# Patient Record
Sex: MALE | Race: WHITE
[De-identification: names, ages, dates, MRNs, and addresses within clinical notes are randomized per-mention and may not be internally consistent; named-entity substitution may affect disease eponyms.]

---

## 2017-09-21 ENCOUNTER — HOSPITAL ENCOUNTER (OUTPATIENT)
Dept: HOSPITAL 17 - HRAD | Age: 82
Discharge: HOME | End: 2017-09-21
Attending: INTERNAL MEDICINE
Payer: MEDICARE

## 2017-09-21 VITALS
OXYGEN SATURATION: 97 % | RESPIRATION RATE: 14 BRPM | SYSTOLIC BLOOD PRESSURE: 178 MMHG | TEMPERATURE: 97.2 F | DIASTOLIC BLOOD PRESSURE: 78 MMHG | HEART RATE: 51 BPM

## 2017-09-21 VITALS
HEART RATE: 67 BPM | TEMPERATURE: 97.5 F | OXYGEN SATURATION: 99 % | SYSTOLIC BLOOD PRESSURE: 180 MMHG | DIASTOLIC BLOOD PRESSURE: 84 MMHG | RESPIRATION RATE: 20 BRPM

## 2017-09-21 VITALS
SYSTOLIC BLOOD PRESSURE: 187 MMHG | OXYGEN SATURATION: 98 % | HEART RATE: 68 BPM | DIASTOLIC BLOOD PRESSURE: 85 MMHG | RESPIRATION RATE: 18 BRPM

## 2017-09-21 DIAGNOSIS — I10: ICD-10-CM

## 2017-09-21 DIAGNOSIS — E03.9: ICD-10-CM

## 2017-09-21 DIAGNOSIS — E78.00: ICD-10-CM

## 2017-09-21 DIAGNOSIS — R59.0: Primary | ICD-10-CM

## 2017-09-21 PROCEDURE — 76942 ECHO GUIDE FOR BIOPSY: CPT

## 2017-09-21 PROCEDURE — 88184 FLOWCYTOMETRY/ TC 1 MARKER: CPT

## 2017-09-21 PROCEDURE — 88185 FLOWCYTOMETRY/TC ADD-ON: CPT

## 2017-09-21 PROCEDURE — 38505 NEEDLE BIOPSY LYMPH NODES: CPT

## 2017-09-21 PROCEDURE — 88305 TISSUE EXAM BY PATHOLOGIST: CPT

## 2017-09-21 NOTE — RADRPT
EXAM DATE/TIME:  09/21/2017 12:25 

 

[facility] COMPARISON:     

No previous studies available for comparison.

 

 

INDICATIONS :      

Right neck enlarged lymph node.

 

 MEDICAL HISTORY :     

Hypertension. Hypothyroidism. Hypercholesterolemia. 

 

SURGICAL HISTORY :        

Cardiac stents.  Back surgery.

 

ENCOUNTER:     

Subsequent

 

ACUITY:     

2 weeks

 

PAIN SCORE:     

10/10

 

LOCATION:     

Right neck.

                     

 

ORGAN:      

Right lymph node 

 

SPECIMENS:      

Three core specimen(s) submitted for pathologic evaluation.

 

DEVICE:      

18 gauge Temno needle

                     

Post procedure scanning reveals no hematoma or other complication.

 

The possibility does exist that the tissue obtained will be non-diagnostic.  If the sample is non-gerard
gnostic a repeat

biopsy or surgical biopsy may need to be performed.  

 

TECHNIQUE:  

1.  Ultrasound guidance for needle biopsy.

2.  Needle biopsy.

 

The risks, benefits and alternatives to the procedure were explained and verbal and written consent w
as obtained.  The site was prepped in sterile fashion.  Full sterile technique was used, including ca
p, mask, sterile gloves and gown and a large sterile sheet.  Hand hygiene and 2% chlorhexidine and/or
 betadine/alcohol prep was utilized per protocol for cutaneous antisepsis.  The skin and subcutaneous
 tissues were infiltrated with local anesthetic solution.  Sterile gel and sterile probe cover were u
tilized for ultrasound guidance.

 

With the patient on the ultrasound table, images were obtained.  

 

A needle was advanced into the identified target and the number of specimens as above obtained and grewal
bmitted for pathologic evaluation.

 

The patient tolerated the procedure well and left the ultrasound suite in stable condition. 

 

CONCLUSION:     

Uncomplicated ultrasound guided needle biopsy of enlarged lymph node right posterior neck.  

 

 

 

 Alfredo Shipley MD on September 21, 2017 at 15:13           

Board Certified Radiologist.

 This report was verified electronically.

## 2018-03-27 ENCOUNTER — HOSPITAL ENCOUNTER (OUTPATIENT)
Dept: HOSPITAL 17 - NEPE | Age: 83
Setting detail: OBSERVATION
LOS: 2 days | Discharge: HOME HEALTH | End: 2018-03-29
Attending: HOSPITALIST | Admitting: HOSPITALIST
Payer: MEDICARE

## 2018-03-27 VITALS
HEART RATE: 57 BPM | SYSTOLIC BLOOD PRESSURE: 179 MMHG | RESPIRATION RATE: 20 BRPM | TEMPERATURE: 97.2 F | DIASTOLIC BLOOD PRESSURE: 80 MMHG | OXYGEN SATURATION: 96 %

## 2018-03-27 VITALS
RESPIRATION RATE: 17 BRPM | TEMPERATURE: 97.6 F | SYSTOLIC BLOOD PRESSURE: 163 MMHG | OXYGEN SATURATION: 100 % | DIASTOLIC BLOOD PRESSURE: 76 MMHG | HEART RATE: 61 BPM

## 2018-03-27 VITALS
OXYGEN SATURATION: 100 % | RESPIRATION RATE: 17 BRPM | TEMPERATURE: 97.6 F | HEART RATE: 61 BPM | SYSTOLIC BLOOD PRESSURE: 163 MMHG | DIASTOLIC BLOOD PRESSURE: 76 MMHG

## 2018-03-27 VITALS
TEMPERATURE: 97.6 F | RESPIRATION RATE: 18 BRPM | SYSTOLIC BLOOD PRESSURE: 145 MMHG | OXYGEN SATURATION: 95 % | HEART RATE: 60 BPM | DIASTOLIC BLOOD PRESSURE: 67 MMHG

## 2018-03-27 VITALS
SYSTOLIC BLOOD PRESSURE: 163 MMHG | RESPIRATION RATE: 17 BRPM | OXYGEN SATURATION: 100 % | TEMPERATURE: 97.6 F | HEART RATE: 61 BPM | DIASTOLIC BLOOD PRESSURE: 76 MMHG

## 2018-03-27 VITALS
OXYGEN SATURATION: 97 % | SYSTOLIC BLOOD PRESSURE: 141 MMHG | DIASTOLIC BLOOD PRESSURE: 80 MMHG | RESPIRATION RATE: 18 BRPM | HEART RATE: 54 BPM | TEMPERATURE: 98 F

## 2018-03-27 VITALS
OXYGEN SATURATION: 100 % | HEART RATE: 61 BPM | SYSTOLIC BLOOD PRESSURE: 163 MMHG | TEMPERATURE: 97.6 F | DIASTOLIC BLOOD PRESSURE: 79 MMHG | RESPIRATION RATE: 17 BRPM

## 2018-03-27 VITALS — HEIGHT: 71 IN | WEIGHT: 148.37 LBS | BODY MASS INDEX: 20.77 KG/M2

## 2018-03-27 DIAGNOSIS — Z85.118: ICD-10-CM

## 2018-03-27 DIAGNOSIS — Z79.01: ICD-10-CM

## 2018-03-27 DIAGNOSIS — Z79.82: ICD-10-CM

## 2018-03-27 DIAGNOSIS — Z82.49: ICD-10-CM

## 2018-03-27 DIAGNOSIS — M19.90: ICD-10-CM

## 2018-03-27 DIAGNOSIS — E78.00: ICD-10-CM

## 2018-03-27 DIAGNOSIS — R51: ICD-10-CM

## 2018-03-27 DIAGNOSIS — I25.10: ICD-10-CM

## 2018-03-27 DIAGNOSIS — Z79.899: ICD-10-CM

## 2018-03-27 DIAGNOSIS — I45.2: ICD-10-CM

## 2018-03-27 DIAGNOSIS — Z95.5: ICD-10-CM

## 2018-03-27 DIAGNOSIS — R07.89: Primary | ICD-10-CM

## 2018-03-27 DIAGNOSIS — I10: ICD-10-CM

## 2018-03-27 DIAGNOSIS — Z85.89: ICD-10-CM

## 2018-03-27 DIAGNOSIS — E03.9: ICD-10-CM

## 2018-03-27 LAB
BASOPHILS # BLD AUTO: 0 TH/MM3 (ref 0–0.2)
BASOPHILS NFR BLD: 0.5 % (ref 0–2)
BUN SERPL-MCNC: 21 MG/DL (ref 7–18)
CALCIUM SERPL-MCNC: 9.2 MG/DL (ref 8.5–10.1)
CHLORIDE SERPL-SCNC: 108 MEQ/L (ref 98–107)
CHOLEST SERPL-MCNC: 177 MG/DL (ref 120–200)
CHOLESTEROL/ HDL RATIO: 4.82 RATIO
CREAT SERPL-MCNC: 1 MG/DL (ref 0.6–1.3)
EOSINOPHIL # BLD: 0.1 TH/MM3 (ref 0–0.4)
EOSINOPHIL NFR BLD: 1.6 % (ref 0–4)
ERYTHROCYTE [DISTWIDTH] IN BLOOD BY AUTOMATED COUNT: 14.7 % (ref 11.6–17.2)
GFR SERPLBLD BASED ON 1.73 SQ M-ARVRAT: 70 ML/MIN (ref 89–?)
GLUCOSE SERPL-MCNC: 104 MG/DL (ref 74–106)
HCO3 BLD-SCNC: 22 MEQ/L (ref 21–32)
HCT VFR BLD CALC: 38.2 % (ref 39–51)
HDLC SERPL-MCNC: 36.7 MG/DL (ref 40–60)
HGB BLD-MCNC: 13.2 GM/DL (ref 13–17)
INR PPP: 1.2 RATIO
LDLC SERPL-MCNC: 118 MG/DL (ref 0–99)
LYMPHOCYTES # BLD AUTO: 0.5 TH/MM3 (ref 1–4.8)
LYMPHOCYTES NFR BLD AUTO: 7.4 % (ref 9–44)
MAGNESIUM SERPL-MCNC: 2.4 MG/DL (ref 1.5–2.5)
MCH RBC QN AUTO: 29.2 PG (ref 27–34)
MCHC RBC AUTO-ENTMCNC: 34.5 % (ref 32–36)
MCV RBC AUTO: 84.8 FL (ref 80–100)
MONOCYTE #: 0.7 TH/MM3 (ref 0–0.9)
MONOCYTES NFR BLD: 10.2 % (ref 0–8)
NEUTROPHILS # BLD AUTO: 5.3 TH/MM3 (ref 1.8–7.7)
NEUTROPHILS NFR BLD AUTO: 80.3 % (ref 16–70)
PLATELET # BLD: 232 TH/MM3 (ref 150–450)
PMV BLD AUTO: 7.7 FL (ref 7–11)
PROTHROMBIN TIME: 11.7 SEC (ref 9.8–11.6)
RBC # BLD AUTO: 4.5 MIL/MM3 (ref 4.5–5.9)
SODIUM SERPL-SCNC: 141 MEQ/L (ref 136–145)
TRIGL SERPL-MCNC: 110 MG/DL (ref 42–150)
TROPONIN I SERPL-MCNC: (no result) NG/ML (ref 0.02–0.05)
WBC # BLD AUTO: 6.5 TH/MM3 (ref 4–11)

## 2018-03-27 PROCEDURE — 83735 ASSAY OF MAGNESIUM: CPT

## 2018-03-27 PROCEDURE — 80061 LIPID PANEL: CPT

## 2018-03-27 PROCEDURE — 71046 X-RAY EXAM CHEST 2 VIEWS: CPT

## 2018-03-27 PROCEDURE — G0378 HOSPITAL OBSERVATION PER HR: HCPCS

## 2018-03-27 PROCEDURE — 85610 PROTHROMBIN TIME: CPT

## 2018-03-27 PROCEDURE — 84484 ASSAY OF TROPONIN QUANT: CPT

## 2018-03-27 PROCEDURE — 80048 BASIC METABOLIC PNL TOTAL CA: CPT

## 2018-03-27 PROCEDURE — 96365 THER/PROPH/DIAG IV INF INIT: CPT

## 2018-03-27 PROCEDURE — 85730 THROMBOPLASTIN TIME PARTIAL: CPT

## 2018-03-27 PROCEDURE — 85025 COMPLETE CBC W/AUTO DIFF WBC: CPT

## 2018-03-27 PROCEDURE — 82550 ASSAY OF CK (CPK): CPT

## 2018-03-27 PROCEDURE — 96366 THER/PROPH/DIAG IV INF ADDON: CPT

## 2018-03-27 PROCEDURE — 93005 ELECTROCARDIOGRAM TRACING: CPT

## 2018-03-27 PROCEDURE — 96375 TX/PRO/DX INJ NEW DRUG ADDON: CPT

## 2018-03-27 NOTE — PD
HPI


Chief Complaint:  Chest Pain


Time Seen by Provider:  13:27


Travel History


International Travel<30 days:  No


Contact w/Intl Traveler<30days:  No


Traveled to known affect area:  No





History of Present Illness


HPI


89-year-old male came to the emergency room with history of sudden onset chest 

pain while he was in the waiting room to get his radiation therapy for his 

cancer.  Patient points to her substernal area and said the pain started 

radiating towards his left chest.  He got up and alerted one of the nurses who 

called 911.  Patient was in [facility] when this happened.  EMS gave him sublingual 

nitroglycerin and aspirin.  By the time patient arrived in the emergency room 

pain was 0.  He denies any associated symptoms.  Vital signs were stable.  

Patient has history of coronary artery disease and says that he had a stent put 

in about 3-4 years ago.  Patient says his cardiologist is Dr. Nunez.  Vital 

signs were stable.





PFSH


Past Medical History


*** Narrative Medical


List of his past medical, surgical, social and family history is reviewed from 

the nursing note.


Hx Anticoagulant Therapy:  Yes


Arthritis:  Yes


Blood Disorders:  No


Heart Rhythm Problems:  Yes (ATR. FIB)


Cancer:  Yes (LUNG)


Cardiovascular Problems:  Yes


Chemotherapy:  Yes


Endocrine:  No


Genitourinary:  No


Hypertension:  Yes


Immune Disorder:  No


Implanted Vascular Access Dvce:  Yes


Musculoskeletal:  Yes


Neurologic:  Yes


Psychiatric:  No


Reproductive:  No


Respiratory:  Yes


Radiation Therapy:  Yes


Tetanus Vaccination:  < 5 Years


Influenza Vaccination:  Yes





Past Surgical History


Eye Surgery:  Yes (CHESTER. CATARACT EXTRACT.)


Neurologic Surgery:  Yes (CERVICAL FUSION)


Other Surgery:  Yes





Social History


Alcohol Use:  Yes (WINE DAILY)


Tobacco Use:  No


Substance Use:  No





Allergies-Medications


(Allergen,Severity, Reaction):  


Coded Allergies:  


     No Known Allergies (Verified  Allergy, Unknown, 3/27/18)


Comments


No known drug allergies.


Reported Meds & Prescriptions





Reported Meds & Active Scripts


Active


Reported


Diltiazem ER 24  Mg Jose 240 Mg PO DAILY


Amiodarone (Amiodarone HCl) 200 Mg Tab 200 Mg PO DAILY


Levothyroxine (Levothyroxine Sodium) 75 Mcg Tab 75 Mcg PO DAILY


Warfarin 5 Mg Tab 5 Mg PO DAILY


Aspirin Unknown Strength Tab 1 Tab PO DAILY





Narrative Medication


List of his home medications reviewed from the nursing note.





Review of Systems


Except as stated in HPI:  all other systems reviewed are Neg


Cardiovascular:  Positive: Chest Pain or Discomfort





Physical Exam


Narrative


GENERAL: Awake, alert, elderly, frail, anxious


SKIN: Focused skin assessment warm/dry.


HEAD: Atraumatic. Normocephalic. 


EYES: Pupils equal and round. No scleral icterus. No injection or drainage. 


ENT: No nasal bleeding or discharge.  Mucous membranes pink and moist.


NECK: Trachea midline. No JVD. 


CARDIOVASCULAR: Regular rate and rhythm.  No murmur appreciated.


RESPIRATORY: No accessory muscle use. Clear to auscultation. Breath sounds 

equal bilaterally. 


GASTROINTESTINAL: Abdomen soft, non-tender, nondistended. Hepatic and splenic 

margins not palpable. 


MUSCULOSKELETAL: No obvious deformities. No clubbing.  No cyanosis.  No edema. 


NEUROLOGICAL: Awake and alert. No obvious cranial nerve deficits.  Motor 

grossly within normal limits. Normal speech.


PSYCHIATRIC: Appropriate mood and affect; insight and judgment normal.





Data


Data


Last Documented VS





Orders





 Orders


Electrocardiogram (3/27/18 13:29)


Basic Metabolic Panel (Bmp) (3/27/18 13:29)


Ckmb (Isoenzyme) Profile (3/27/18 13:29)


Complete Blood Count With Diff (3/27/18 13:29)


Magnesium (Mg) (3/27/18 13:29)


Prothrombin Time / Inr (Pt) (3/27/18 13:29)


Act Partial Throm Time (Ptt) (3/27/18 13:29)


Troponin I (3/27/18 13:29)


Ecg Monitoring (3/27/18 13:29)


Bilateral Bp Monitoring (3/27/18 13:29)


Iv Access Insert/Monitor (3/27/18 13:29)


Oximetry (3/27/18 13:29)


Oxygen Administration (3/27/18 13:29)


Sodium Chloride 0.9% Flush (Ns Flush) (3/27/18 13:30)


Chest, Pa & Lat (3/27/18 13:29)


Heparin Inj (Heparin Inj) (3/27/18 13:30)


Heparin Inj (Heparin Inj) (3/27/18 19:30)


Heparin Inj (Heparin Inj) (3/27/18 19:30)


Heparin-D5w 25,000 U/250 Ml (Heparin-D5w (3/27/18 13:45)


Act Partial Throm Time (Ptt) (3/27/18 20:30)


Admit Order (Ed Use Only) (3/27/18 14:50)





Labs





Laboratory Tests








Test


  3/27/18


13:45


 


White Blood Count 6.5 TH/MM3 


 


Red Blood Count 4.50 MIL/MM3 


 


Hemoglobin 13.2 GM/DL 


 


Hematocrit 38.2 % 


 


Mean Corpuscular Volume 84.8 FL 


 


Mean Corpuscular Hemoglobin 29.2 PG 


 


Mean Corpuscular Hemoglobin


Concent 34.5 % 


 


 


Red Cell Distribution Width 14.7 % 


 


Platelet Count 232 TH/MM3 


 


Mean Platelet Volume 7.7 FL 


 


Neutrophils (%) (Auto) 80.3 % 


 


Lymphocytes (%) (Auto) 7.4 % 


 


Monocytes (%) (Auto) 10.2 % 


 


Eosinophils (%) (Auto) 1.6 % 


 


Basophils (%) (Auto) 0.5 % 


 


Neutrophils # (Auto) 5.3 TH/MM3 


 


Lymphocytes # (Auto) 0.5 TH/MM3 


 


Monocytes # (Auto) 0.7 TH/MM3 


 


Eosinophils # (Auto) 0.1 TH/MM3 


 


Basophils # (Auto) 0.0 TH/MM3 


 


CBC Comment DIFF FINAL 


 


Differential Comment  


 


Prothrombin Time 11.7 SEC 


 


Prothromb Time International


Ratio 1.2 RATIO 


 


 


Activated Partial


Thromboplast Time 25.5 SEC 


 


 


Blood Urea Nitrogen 21 MG/DL 


 


Creatinine 1.00 MG/DL 


 


Random Glucose 104 MG/DL 


 


Calcium Level 9.2 MG/DL 


 


Magnesium Level 2.4 MG/DL 


 


Sodium Level 141 MEQ/L 


 


Potassium Level 3.8 MEQ/L 


 


Chloride Level 108 MEQ/L 


 


Carbon Dioxide Level 22.0 MEQ/L 


 


Anion Gap 11 MEQ/L 


 


Estimat Glomerular Filtration


Rate 70 ML/MIN 


 


 


Total Creatine Kinase 70 U/L 


 


Troponin I


  LESS THAN 0.02


NG/ML











MDM


Medical Decision Making


Medical Screen Exam Complete:  Yes


Emergency Medical Condition:  Yes


Medical Record Reviewed:  Yes


Interpretation(s)


Twelve-lead EKG was reviewed by me.  Normal sinus rhythm, normal axis, right 

bundle branch block, septal ST depressions that are new since 2009 EKG.  Heart 

rate of 62 bpm.


Differential Diagnosis


ACS, non-STEMI


Narrative Course


2:45 PM I discussed the case after looking at the EKG with patient's 

cardiologist Dr. Hernandez.  I started the patient on heparin bolus and drip and 

the cardiologist was in agreement with this plan.  He wanted the patient to be 

admitted to the hospitalist service and he will consult on the patient.  Blood 

test results of back and they're within acceptable limits.  I discussed the 

case with the hospitalist was accepted the patient.


Critical Care Narrative


Aggregate critical care time was 30 minutes. Time to perform other separately 

billable procedures was not  


included in the critical care time. My time did not include minutes spent 

treating any other patients simultaneously or on  


activities that did not directly contribute to the patient's treatment.  





The services I provided to this patient were to treat and/or prevent clinically 

significant deterioration that could result  


in: ACS, heparin bolus and drip





I provided critical care services requiring my management, as noted below:


Chart data review, documentation time, medication orders and management, vital 

sign assessments/reviewing monitor data,  


ordering and reviewing lab tests, ordering and interpreting/reviewing x-rays 

and diagnostic studies, care of the patient  


and discussion of the patient with the admitting physicians.





Procedures


EKG Prior to Arrival:  Yes





Physician Communication


Physician Communication


Dr. Hernandez





Diagnosis





 Primary Impression:  


 Chest pain


 Qualified Codes:  R07.9 - Chest pain, unspecified


 Additional Impression:  


 History of coronary artery disease





Admitting Information


Admitting Physician Requests:  Observation


Scripts


Isosorbide Mononitrate ER (Isosorbide Mononitrate ER) 30 Mg Jose


30 MG PO DAILY@07 for Chest Pain for 30 Days, #30 TAB


   Prov: Mark Hagan MD         3/29/18 


Atorvastatin (Atorvastatin) 80 Mg Tab


80 MG PO DAILY for ACS for 30 Days, #30 TAB


   Prov: Mark Hagan MD         3/29/18 


Clopidogrel (Plavix) 75 Mg Tab


75 MG PO DAILY for ACS for 30 Days, #30 TAB 1 Refill


   Prov: Mark Hagan MD         3/29/18











William Roberts MD Mar 27, 2018 14:49

## 2018-03-27 NOTE — MB
cc:

Sylvester Hernandez MD

****

 

 

DATE:

03/27/2018

 

REASON FOR CONSULTATION:

I was asked to evaluate the patient with chest pain syndrome and 

history of coronary artery disease.

HISTORY OF PRESENT ILLNESS:

This is a pleasant 89-year-old gentleman with a past medical history 

significant for paroxysmal atrial fibrillation, status post 

cardioversion x 3, high risk CHADS-VASc score, hypertension, 

hypercholesterolemia, mild cardiomyopathy, ejection fraction 40% and 

coronary artery disease, status post stent to the LAD 11/2011.  

Unfortunately, he has recurrent left upper lung cancer.  This morning,

[facility] waiting to have his radiation treatment.  

He developed rapidly progressive shortness of breath and chest pain 

across his left precordium.  The pain was an achy discomfort, 

prompting him to notify the attendant nurse.  He was given a 

nitroglycerin and aspirin by EMS.  His chest pain resolved upon 

arrival to the emergency room.

 

ER evaluation significant for EKG sinus rhythm, right bundle branch 

block, diffuse nonspecific ST-T abnormalities.  First troponin level 

is negative for acute coronary syndrome.

 

MEDICATIONS PRIOR TO ADMISSION:

1.  Warfarin 5 mg daily.

2.  Amiodarone 200 mg daily.

3.  Diltiazem  mg daily.

4.  Atorvastatin 20 mg daily.

5.  Vicodin 10/300 mg q.6 hours p.r.n.

6.  Aciphex 20 mg daily.

7.  Valsartan 160 mg daily.

8.  Rabeprazole 10 mg daily.

9.  Naprosyn 500 mg p.r.n.

10.  Omega 3 fatty acid supplements.

11.  Synthroid 50 mcg daily.

12.  Hydrocodone/acetaminophen 10/650 mg p.r.n.

 

ALLERGIES:

NO KNOWN DRUG ALLERGIES.

 

PAST MEDICAL HISTORY:

As above.  He also has history of hypothyroidism.

 

PAST CARDIAC HISTORY:

PTCA, bare metal stent distal LAD 11/2011.  Last stress myocardial 

perfusion imaging study 07/2013 negative for ischemia.

 

SOCIAL HISTORY:

He does not smoke.  He drinks a glass of wine daily.  No illicit drug 

use.  He lives with his wife.

 

REVIEW OF SYSTEMS:

As above, 12-point review of systems reviewed and noted.  No recent 

fever, chills, cough and sputum production.  No recent 

gastrointestinal and genitourinary symptoms.  He is receiving 

chemotherapy for recurrent lung cancer.

 

 

PAST SURGICAL HISTORY:

As above, status post cervical fusion.

 

PHYSICAL EXAMINATION:

GENERAL:  No acute distress.  No chest pain, comfortable respirations.

VITAL SIGNS:  Temperature 97.6, pulse 61, respirations 17, blood 

pressure 163/76, O2 saturation room air 100%.

HEENT:  Anicteric.  PERRL.  No xanthelasmas.

NECK:  Flat JVD.   No carotid bruits.

LUNGS:  Clear to auscultation.  No chest wall tenderness.

HEART:  Irregular rate and rhythm, II/VI systolic murmur left lower 

sternal border.

ABDOMEN:  Soft and nontender.

EXTREMITIES:  Without peripheral edema.

 

LABORATORY DATA:

WBC 6.5, hemoglobin 13.2, hematocrit 38.2, platelet count is 232,000. 

PT is 11.7, INR is 1.2, BUN 21, creatinine 1.0, sodium 141, potassium 

3.8, chloride 108, anion gap is 11.  Troponin less than 0.02.

 

IMPRESSION:

1.  Chest pain syndrome with typical and atypical features for angina.

2.  History of known coronary artery disease.

3.  Paroxysmal atrial fibrillation, remains in sinus rhythm.

4.  Hypertension.

5.  Hypercholesterolemia.

6.  Mild cardiomyopathy, ejection fraction 40%.

7.  Recurrent lung cancer, receiving chemotherapy.

 

PLAN:

1.  Continue to trend troponin level.

2.  Continue IV heparin.

3.  Nitroglycerin paste 1 inch q.6 hours.

4.  Continue amiodarone, diltiazem, atorvastatin and valsartan as 

listed.

5.  Discussed with the patient he may need cardiac catheterization if 

he rules in for acute coronary syndrome versus continued medical 

management in light of his comorbidities.

 

Thank you for allowing me to contribute the patient's care.

 

Thank you for this consultation.

 

 

__________________________________

MD SHANI BernardV/KD

D: 03/27/2018, 05:57 PM

T: 03/27/2018, 06:43 PM

Visit #: [id number]

Job #: [id number]

## 2018-03-27 NOTE — HHI.HP
__________________________________________________





HPI


Service


[facility]


Primary Care Physician


Manisha Webb D.O.


Admission Diagnosis





chest pain, history of coronary artery disease


Diagnoses:  


Chief Complaint:  


Chest pain


Travel History


International Travel<30 Days:  No


Contact w/Intl Traveler <30 Da:  No


Traveled to Known Affected Are:  No


History of Present Illness


89 years old male presented to the ED with sudden onset chest pain 10 out of 10 

with no transfer no dizziness no nausea or vomiting positive short of breath no 

diaphoresis, in general patient is extremely poor historian he always answer "I 

do not know".  Patient has a past medical history of coronary artery disease 

with stenting he reported having history of cancer but he was not sure where he 

does know it is in his had, possibly head and neck versus brain he also 

mentioned lung tumor. 


Patient denied any other symptoms such as abdominal pain diarrhea constipation 

dysuria urgency frequency orthopnea or PND





 by reviewing previous records from radiation therapy, he revealed patient 

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 

suspected to be metastatic from a previous skin cancer PET scan showed right 

upper lobe nodule medullary with an SUV due to previous PET scan on 2017





Review of Systems


Except as stated in HPI:  all other systems reviewed are Neg


All  systems reviewed and was positive for what is mentioned in history of 

present illness otherwise negative





Past Family Social History


Past Medical History


 A. fib


Lung cancer, questionable head and neck cancer


Arthritis





Hypertension


Port implanted He is on chemotherapy


Patient also mentioned history of left pleural effusion


Past Surgical History


Cataract


Cervical fusion


Allergies:  


Coded Allergies:  


     No Known Allergies (Verified  Allergy, Unknown, 3/27/18)


Family History


Mother  with heart attack, father had a brain tumor


Social History


No tobacco or illicit drug abuse, patient admitted drinking wine every day





Physical Exam


Vital Signs





Vital Signs








  Date Time  Temp Pulse Resp B/P (MAP) Pulse Ox O2 Delivery O2 Flow Rate FiO2


 


3/27/18 13:40 97.6 61 17 163/76 (105) 100 Room Air  





    168/79 (108)    


 


3/27/18 13:35 97.6 61 17 163/76 (105) 100 Room Air  


 


3/27/18 13:31     100 Room Air  


 


3/27/18 13:31 97.6 61 17 163/76 (105) 100 Room Air  


 


3/27/18 13:29  61 17  100 Room Air  


 


3/27/18 13:24 97.6 61 17 163/76 (105) 100   








Physical Exam


GENERAL: This is a frail 89 years old male in no acute distress


SKIN: No rashes, warm and dry 


HEAD: Atraumatic. Normocephalic. 


EYES: Pupils equal round and reactive. Extraocular motions intact. No scleral 

icterus. 


ENT: Nose without bleeding, or drainage, Airway patent.


NECK: Trachea midline.  Supple


CARDIOVASCULAR: Regular rate and rhythm without murmurs, gallops, or rubs. 


RESPIRATORY: Fair air entry bilaterally. No wheezes, rales, or rhonchi.  


GASTROINTESTINAL: Abdomen soft, non-tender, nondistended.  Positive bowel sounds


MUSCULOSKELETAL: Extremities without clubbing, cyanosis, or edema.  Pedal 

pulses appreciated


NEUROLOGICAL: Awake and alert.  Moves all extremity.  Normal speech.no focal 

neurological deficit


Laboratory





Laboratory Tests








Test


  3/27/18


13:45 3/27/18


15:28


 


White Blood Count 6.5  


 


Red Blood Count 4.50  


 


Hemoglobin 13.2  


 


Hematocrit 38.2  


 


Mean Corpuscular Volume 84.8  


 


Mean Corpuscular Hemoglobin 29.2  


 


Mean Corpuscular Hemoglobin


Concent 34.5 


  


 


 


Red Cell Distribution Width 14.7  


 


Platelet Count 232  


 


Mean Platelet Volume 7.7  


 


Neutrophils (%) (Auto) 80.3  


 


Lymphocytes (%) (Auto) 7.4  


 


Monocytes (%) (Auto) 10.2  


 


Eosinophils (%) (Auto) 1.6  


 


Basophils (%) (Auto) 0.5  


 


Neutrophils # (Auto) 5.3  


 


Lymphocytes # (Auto) 0.5  


 


Monocytes # (Auto) 0.7  


 


Eosinophils # (Auto) 0.1  


 


Basophils # (Auto) 0.0  


 


CBC Comment DIFF FINAL  


 


Differential Comment   


 


Prothrombin Time 11.7  


 


Prothromb Time International


Ratio 1.2 


  


 


 


Activated Partial


Thromboplast Time 25.5 


  


 


 


Blood Urea Nitrogen 21  


 


Creatinine 1.00  


 


Random Glucose 104  


 


Calcium Level 9.2  


 


Magnesium Level 2.4  


 


Sodium Level 141  


 


Potassium Level 3.8  


 


Chloride Level 108  


 


Carbon Dioxide Level 22.0  


 


Anion Gap 11  


 


Estimat Glomerular Filtration


Rate 70 


  


 


 


Total Creatine Kinase 70  65 


 


Troponin I LESS THAN 0.02  LESS THAN 0.02 








Result Diagram:  


3/27/18 1345                                                                   

             3/27/18 1345





Imaging





Last Impressions








Chest X-Ray 3/27/18 1325 Signed





Impressions: 





 Service Date/Time:  2018 14:26 - CONCLUSION: No acute 





 disease.       Thomas Green Jr., MD Caprini VTE Risk Assessment


Caprini VTE Risk Assessment:  Mod/High Risk (score >= 2)


Caprini Risk Assessment Model











 Point Value = 1          Point Value = 2  Point Value = 3  Point Value = 5


 


Age 41-60


Minor surgery


BMI > 25 kg/m2


Swollen legs


Varicose veins


Pregnancy or postpartum


History of unexplained or recurrent


   spontaneous 


Oral contraceptives or hormone


   replacement


Sepsis (< 1 month)


Serious lung disease, including


   pneumonia (< 1 month)


Abnormal pulmonary function


Acute myocardial infarction


Congestive heart failure (< 1 month)


History of inflammatory bowel disease


Medical patient at bed rest Age 61-74


Arthroscopic surgery


Major open surgery (> 45 min)


Laparoscopic surgery (> 45 min)


Malignancy


Confined to bed (> 72 hours)


Immobilizing plaster cast


Central venous access Age >= 75


History of VTE


Family history of VTE


Factor V Leiden


Prothrombin 00210Q


Lupus anticoagulant


Anticardiolipin antibodies


Elevated serum homocysteine


Heparin-induced thrombocytopenia


Other congenital or acquired


   thrombophilia Stroke (< 1 month)


Elective arthroplasty


Hip, pelvis, or leg fracture


Acute spinal cord injury (< 1 month)








Prophylaxis Regimen











   Total Risk


Factor Score Risk Level Prophylaxis Regimen


 


0-1      Low Early ambulation


 


2 Moderate Order ONE of the following:


*Sequential Compression Device (SCD)


*Heparin 5000 units SQ BID


 


3-4 Higher Order ONE of the following medications:


*Heparin 5000 units SQ TID


*Enoxaparin/Lovenox 40 mg SQ daily (WT < 150 kg, CrCl > 30 mL/min)


*Enoxaparin/Lovenox 30 mg SQ daily (WT < 150 kg, CrCl > 10-29 mL/min)


*Enoxaparin/Lovenox 30 mg SQ BID (WT < 150 kg, CrCl > 30 mL/min)


AND/OR


*Sequential Compression Device (SCD)


 


5 or more Highest Order ONE of the following medications:


*Heparin 5000 units SQ TID (Preferred with Epidurals)


*Enoxaparin/Lovenox 40 mg SQ daily (WT < 150 kg, CrCl > 30 mL/min)


*Enoxaparin/Lovenox 30 mg SQ daily (WT < 150 kg, CrCl > 10-29 mL/min)


*Enoxaparin/Lovenox 30 mg SQ BID (WT < 150 kg, CrCl > 30 mL/min)


AND


*Sequential Compression Device (SCD)











Assessment and Plan


Assessment and Plan


89 years old male with history of disseminated cancer as per the patient, on 

chemotherapy brought to the ED with acute sharp chest pain





Rule out ACS


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 

therapy


Hypertension,


Hyperlipidemia


History of coronary artery disease with stenting


Hypothyroidism


DVT prophylax, patient on heparin drip





Plan:





Admit for observation


Regular protocol to rule out ACS with aspirin, oxygen, morphine, cycle cardiac 

enzyme, serial EKG,


EKG in ED personally reviewed by me showed left bundle branch block with 

inverted T waves on precordial lead but it is not new from previous EKG in 


Consult cardiologist Dr. Hernandez who is the patient cardiologist, it was called 

from ED, he recommended heparin drip


Chest x-ray unremarkable


If cardiac workup is negative I will consult oncology, it may be pleuritis due 

to radiation


Continue home medication Diltiazem, amlodipine and valsartan, aspirin


Patient is on warfarin however INR is 1.2, started on heparin drip











Javier Biswas MD Mar 27, 2018 17:57

## 2018-03-27 NOTE — RADRPT
EXAM DATE/TIME:  03/27/2018 14:26 

 

[facility] COMPARISON:     

No previous studies available for comparison.

 

                     

INDICATIONS :     

Chest pain and cough.

                     

 

MEDICAL HISTORY :            

Pt. is undergoing lung cancer treatment.   

 

SURGICAL HISTORY :     

None.   

 

ENCOUNTER:     

Initial                                        

 

ACUITY:     

1 day      

 

PAIN SCORE:     

9/10

 

LOCATION:     

Bilateral chest 

 

FINDINGS:     

PA and lateral views of the chest demonstrate the lungs to be symmetrically aerated without evidence 
of mass, infiltrate or effusion.  The cardiomediastinal contours are unremarkable.  Osseous structure
s are intact.

 

CONCLUSION:     No acute disease.  

 

 

 

 Niall Castro Jr., MD on March 27, 2018 at 14:58           

Board Certified Radiologist.

 This report was verified electronically.

## 2018-03-28 VITALS
DIASTOLIC BLOOD PRESSURE: 59 MMHG | OXYGEN SATURATION: 96 % | RESPIRATION RATE: 18 BRPM | TEMPERATURE: 97.7 F | HEART RATE: 59 BPM | SYSTOLIC BLOOD PRESSURE: 116 MMHG

## 2018-03-28 VITALS
TEMPERATURE: 97.8 F | OXYGEN SATURATION: 95 % | SYSTOLIC BLOOD PRESSURE: 108 MMHG | RESPIRATION RATE: 18 BRPM | DIASTOLIC BLOOD PRESSURE: 59 MMHG | HEART RATE: 60 BPM

## 2018-03-28 VITALS
RESPIRATION RATE: 18 BRPM | SYSTOLIC BLOOD PRESSURE: 189 MMHG | OXYGEN SATURATION: 97 % | DIASTOLIC BLOOD PRESSURE: 88 MMHG | HEART RATE: 56 BPM | TEMPERATURE: 97.5 F

## 2018-03-28 VITALS
SYSTOLIC BLOOD PRESSURE: 161 MMHG | TEMPERATURE: 97.2 F | RESPIRATION RATE: 18 BRPM | OXYGEN SATURATION: 96 % | HEART RATE: 60 BPM | DIASTOLIC BLOOD PRESSURE: 74 MMHG

## 2018-03-28 VITALS
RESPIRATION RATE: 20 BRPM | SYSTOLIC BLOOD PRESSURE: 128 MMHG | OXYGEN SATURATION: 93 % | TEMPERATURE: 98.3 F | HEART RATE: 57 BPM | DIASTOLIC BLOOD PRESSURE: 62 MMHG

## 2018-03-28 VITALS — HEART RATE: 53 BPM

## 2018-03-28 VITALS — HEART RATE: 54 BPM

## 2018-03-28 VITALS — HEART RATE: 55 BPM

## 2018-03-28 LAB
BUN SERPL-MCNC: 19 MG/DL (ref 7–18)
CALCIUM SERPL-MCNC: 8.7 MG/DL (ref 8.5–10.1)
CHLORIDE SERPL-SCNC: 109 MEQ/L (ref 98–107)
CREAT SERPL-MCNC: 1 MG/DL (ref 0.6–1.3)
GFR SERPLBLD BASED ON 1.73 SQ M-ARVRAT: 70 ML/MIN (ref 89–?)
GLUCOSE SERPL-MCNC: 132 MG/DL (ref 74–106)
HCO3 BLD-SCNC: 24.1 MEQ/L (ref 21–32)
SODIUM SERPL-SCNC: 141 MEQ/L (ref 136–145)
TROPONIN I SERPL-MCNC: (no result) NG/ML (ref 0.02–0.05)

## 2018-03-28 RX ADMIN — NITROGLYCERIN SCH INCH: 20 OINTMENT TOPICAL at 05:03

## 2018-03-28 RX ADMIN — LEVOTHYROXINE SODIUM SCH MCG: 75 TABLET ORAL at 05:03

## 2018-03-28 RX ADMIN — AMIODARONE HYDROCHLORIDE SCH MG: 200 TABLET ORAL at 09:24

## 2018-03-28 RX ADMIN — NITROGLYCERIN SCH INCH: 20 OINTMENT TOPICAL at 13:24

## 2018-03-28 RX ADMIN — DILTIAZEM HYDROCHLORIDE SCH MG: 240 CAPSULE, EXTENDED RELEASE ORAL at 09:24

## 2018-03-28 RX ADMIN — NITROGLYCERIN SCH INCH: 20 OINTMENT TOPICAL at 18:12

## 2018-03-28 RX ADMIN — ATORVASTATIN CALCIUM SCH MG: 80 TABLET, FILM COATED ORAL at 09:24

## 2018-03-28 RX ADMIN — NITROGLYCERIN SCH INCH: 20 OINTMENT TOPICAL at 00:00

## 2018-03-28 RX ADMIN — CLOPIDOGREL BISULFATE SCH MG: 75 TABLET, FILM COATED ORAL at 22:30

## 2018-03-28 NOTE — EKG
Date Performed: 03/27/2018       Time Performed: 13:24:50

 

PTAGE:      89 years

 

EKG:      Sinus rhythm 

 

 RIGHT BUNDLE BRANCH BLOCK ABNORMAL ECG

 

PREVIOUS TRACING       : 01/19/2009 10.41 Since the previous tracing, no significant change noted

 

DOCTOR:   Naveen Christianson  Interpretating Date/Time  03/28/2018 22:59:24

## 2018-03-28 NOTE — PD.CARD.PN
Subjective


Subjective Remarks


FEELS BETTER


NO COMPLAINTS OF CHEST PAIN OR SOB


TROPONINS NEGATIVE FOR ACS


HE WANTS CONSERVATIVE EXPECTANT MEDICAL MGT





Objective


Medications





Current Medications








 Medications


  (Trade)  Dose


 Ordered  Sig/Ha


 Route  Start Time


 Stop Time Status Last Admin


 


  (NS Flush)  2 ml  UNSCH  PRN


 IVF  3/27/18 13:30


     


 


 


  (Heparin Inj)  5,000 units  UNSCH  PRN


 IV  3/27/18 19:30


     


 


 


  (Heparin Inj)  2,500 units  UNSCH  PRN


 IV  3/27/18 19:30


     


 


 


 Heparin Sodium/


 Dextrose  250 ml @ 8


 mls/hr  TITRATE  PRN


 IV  3/27/18 13:45


    3/27/18 15:05


 


 


  (Ecotrin Ec)  325 mg  DAILY


 PO  3/28/18 09:00


    3/28/18 09:24


 


 


  (Nitrostat Sl)  0.4 mg  Q5M  PRN


 SL  3/27/18 15:00


     


 


 


  (Morphine Inj)  2 mg  Q30M  PRN


 IV PUSH  3/27/18 15:00


     


 


 


  (Tylenol)  650 mg  Q6H  PRN


 PO  3/27/18 15:00


     


 


 


  (Colace)  100 mg  BID  PRN


 PO  3/27/18 15:00


     


 


 


  (Nitroglycerin


 2% Oint)  1 inch  Q6HR


 TOPICAL  3/27/18 18:00


    3/28/18 18:12


 


 


  (Lipitor)  80 mg  DAILY


 PO  3/28/18 09:00


    3/28/18 09:24


 


 


  (Diovan)  160 mg  DAILY


 PO  3/28/18 09:00


    3/28/18 09:24


 


 


  (Cordarone)  200 mg  DAILY


 PO  3/28/18 09:00


    3/28/18 09:24


 


 


  (Cardizem Cd)  240 mg  DAILY


 PO  3/28/18 09:00


    3/28/18 09:24


 


 


  (Synthroid)  75 mcg  DAILY@0600


 PO  3/28/18 06:00


    3/28/18 05:03


 


 


  (Norvasc)  10 mg  DAILY


 PO  3/28/18 09:00


    3/28/18 09:24


 








Vital Signs / I&O





Vital Signs








  Date Time  Temp Pulse Resp B/P (MAP) Pulse Ox O2 Delivery O2 Flow Rate FiO2


 


3/28/18 12:00 97.8 60 18 108/59 (75) 95   


 


3/28/18 08:00 97.2 60 18 161/74 (103) 96   


 


3/28/18 04:00  56      


 


3/28/18 04:00 97.5 56 18 189/88 (121) 97   


 


3/28/18 04:00      Room Air  


 


3/28/18 01:57      Room Air  


 


3/28/18 00:00  53      


 


3/27/18 23:20 98.0 54 18 141/80 (100) 97   


 


3/27/18 20:00  65      


 


3/27/18 20:00      Room Air  


 


3/27/18 20:00 97.6 60 18 145/67 (93) 95   














I/O      


 


 3/27/18 3/27/18 3/27/18 3/28/18 3/28/18 3/28/18





 07:00 15:00 23:00 07:00 15:00 23:00


 


Intake Total    120 ml  


 


Output Total    350 ml  


 


Balance    -230 ml  


 


      


 


Intake Oral    120 ml  


 


Output Urine Total    350 ml  


 


# Bowel Movements    0  








Physical Exam


NAD


ANICTERIC, JAMES


FLAT JVDS


LUNGS CLEAR


RR, 2/6 LLSB MURMUR


ABD SOFT


EXTREMITIES BENIGN


Laboratory





Laboratory Tests








Test


  3/27/18


21:22 3/28/18


03:09 3/28/18


09:20


 


Activated Partial


Thromboplast Time 54.8 SEC 


  46.2 SEC 


  


 


 


Total Creatine Kinase 58 U/L  60 U/L  


 


Troponin I


  LESS THAN 0.02


NG/ML LESS THAN 0.02


NG/ML 


 


 


Triglycerides Level 110 MG/DL   


 


Cholesterol Level 177 MG/DL   


 


LDL Cholesterol 118 MG/DL   


 


HDL Cholesterol 36.7 MG/DL   


 


Cholesterol/HDL Ratio 4.82 RATIO   


 


Blood Urea Nitrogen   19 MG/DL 


 


Creatinine   1.00 MG/DL 


 


Random Glucose   132 MG/DL 


 


Calcium Level   8.7 MG/DL 


 


Sodium Level   141 MEQ/L 


 


Potassium Level   3.5 MEQ/L 


 


Chloride Level   109 MEQ/L 


 


Carbon Dioxide Level   24.1 MEQ/L 


 


Anion Gap   8 MEQ/L 


 


Estimat Glomerular Filtration


Rate 


  


  70 ML/MIN 


 











Assessment and Plan


Assessment and Plan


CHEST PAIN


NO ACS


LUNG CANCER





PLAN:


OK TO DC IV HEPARIN AND NTGP


PLAVIX 75 MG QD


IMDUR 30 MG QD


AMLODIPINE DC'D, ON CARDIZEM


OK TO DC IN AM WITH UNEVENTFUL EVENING











Sylvester Hernandez MD Mar 28, 2018 18:39

## 2018-03-28 NOTE — HHI.PR
Subjective


Remarks


Patient reported feeling chest pressure 5 out of 10


No diaphoresis, no short of breath, she is on IV heparin possible going to cath 

by Dr. Hernandez cardiac enzymes still negative, patient is afebrile





Objective


Vitals





Vital Signs








  Date Time  Temp Pulse Resp B/P (MAP) Pulse Ox O2 Delivery O2 Flow Rate FiO2


 


3/28/18 12:00 97.8 60 18 108/59 (75) 95   


 


3/28/18 08:00 97.2 60 18 161/74 (103) 96   


 


3/28/18 04:00  56      


 


3/28/18 04:00 97.5 56 18 189/88 (121) 97   


 


3/28/18 04:00      Room Air  


 


3/28/18 01:57      Room Air  


 


3/28/18 00:00  53      


 


3/27/18 23:20 98.0 54 18 141/80 (100) 97   


 


3/27/18 20:00  65      


 


3/27/18 20:00      Room Air  


 


3/27/18 20:00 97.6 60 18 145/67 (93) 95   


 


3/27/18 17:10 97.2 57 20 179/80 (113) 96   














I/O      


 


 3/27/18 3/27/18 3/27/18 3/28/18 3/28/18 3/28/18





 07:00 15:00 23:00 07:00 15:00 23:00


 


Intake Total    120 ml  


 


Output Total    350 ml  


 


Balance    -230 ml  


 


      


 


Intake Oral    120 ml  


 


Output Urine Total    350 ml  


 


# Bowel Movements    0  








Result Diagram:  


3/27/18 1345                                                                   

             3/28/18 0920





Objective Remarks


GENERAL: This is a frail 89 years old male in no acute distress


SKIN: No rashes, warm and dry 


HEAD: Atraumatic. Normocephalic. 


EYES: Pupils equal round and reactive. Extraocular motions intact. No scleral 

icterus. 


ENT: Nose without bleeding, or drainage, Airway patent.


NECK: Trachea midline.  Supple


CARDIOVASCULAR: Regular rate and rhythm without murmurs, gallops, or rubs. 


RESPIRATORY: Fair air entry bilaterally. No wheezes, rales, or rhonchi.  


GASTROINTESTINAL: Abdomen soft, non-tender, nondistended.  Positive bowel sounds


MUSCULOSKELETAL: Extremities without clubbing, cyanosis, or edema.  Pedal 

pulses appreciated


NEUROLOGICAL: Awake and alert.  Moves all extremity.  Normal speech.no focal 

neurological deficit





A/P


Assessment and Plan





89 years old male with history of disseminated cancer as per the patient, on 

chemotherapy brought to the ED with acute sharp chest pain





Rule out ACS


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 

therapy


Hypertension,


Hyperlipidemia


History of coronary artery disease with stenting


Hypothyroidism


DVT prophylax, patient on heparin drip





Plan:


Consult cardiologist Dr. Hernandez who is the patient cardiologist, it was called 

from ED, he recommended heparin drip, appreciate their help, possible plan for 

heart cath later on


Regular protocol to rule out ACS with aspirin, oxygen, morphine, negative 

cardiac enzyme, serial EKG no acute changes


EKG in ED personally reviewed by me showed left bundle branch block with 

inverted T waves on precordial lead but it is not new from previous EKG in 2009


Chest x-ray unremarkable


If cardiac workup is negative  consult oncology, it may be pleuritis due to 

radiation


Continue home medication Diltiazem, amlodipine and valsartan, aspirin


Patient is on warfarin however INR is 1.2, started on heparin drip











Javier Biswas MD Mar 28, 2018 14:41

## 2018-03-29 VITALS
HEART RATE: 52 BPM | TEMPERATURE: 97.6 F | DIASTOLIC BLOOD PRESSURE: 62 MMHG | OXYGEN SATURATION: 96 % | RESPIRATION RATE: 20 BRPM | SYSTOLIC BLOOD PRESSURE: 140 MMHG

## 2018-03-29 VITALS — HEART RATE: 46 BPM

## 2018-03-29 VITALS
TEMPERATURE: 97.2 F | OXYGEN SATURATION: 96 % | SYSTOLIC BLOOD PRESSURE: 118 MMHG | DIASTOLIC BLOOD PRESSURE: 62 MMHG | HEART RATE: 54 BPM | RESPIRATION RATE: 18 BRPM

## 2018-03-29 VITALS
TEMPERATURE: 97.3 F | OXYGEN SATURATION: 98 % | DIASTOLIC BLOOD PRESSURE: 81 MMHG | HEART RATE: 61 BPM | RESPIRATION RATE: 20 BRPM | SYSTOLIC BLOOD PRESSURE: 156 MMHG

## 2018-03-29 VITALS — OXYGEN SATURATION: 94 %

## 2018-03-29 VITALS
RESPIRATION RATE: 18 BRPM | SYSTOLIC BLOOD PRESSURE: 125 MMHG | DIASTOLIC BLOOD PRESSURE: 61 MMHG | OXYGEN SATURATION: 97 % | TEMPERATURE: 97.3 F | HEART RATE: 54 BPM

## 2018-03-29 RX ADMIN — CLOPIDOGREL BISULFATE SCH MG: 75 TABLET, FILM COATED ORAL at 08:20

## 2018-03-29 RX ADMIN — DILTIAZEM HYDROCHLORIDE SCH MG: 240 CAPSULE, EXTENDED RELEASE ORAL at 08:18

## 2018-03-29 RX ADMIN — NITROGLYCERIN SCH INCH: 20 OINTMENT TOPICAL at 00:53

## 2018-03-29 RX ADMIN — AMIODARONE HYDROCHLORIDE SCH MG: 200 TABLET ORAL at 08:19

## 2018-03-29 RX ADMIN — NITROGLYCERIN SCH INCH: 20 OINTMENT TOPICAL at 06:38

## 2018-03-29 RX ADMIN — ATORVASTATIN CALCIUM SCH MG: 80 TABLET, FILM COATED ORAL at 08:19

## 2018-03-29 RX ADMIN — LEVOTHYROXINE SODIUM SCH MCG: 75 TABLET ORAL at 06:38

## 2018-03-29 NOTE — HHI.FF
Face to Face Verification


Diagnosis:  


(1) History of coronary artery disease


(2) paroxmal a fib his


Home Health Nursing








Order: Medical education





 Signs/symptoms of disease process





 Medication education-adverse effect





 Nursing assessment with vital signs

















I have seen patient Asuncion Vanegas on 3/29/18. My clinical findings support 

the need for the requested home health care services because:








 Ltd mobility - disease progression





 Need for psychosocial assistance














I certify that my clinical findings support that this patient is homebound 

because:








 Need for psychosocial assistance

















Mark Hagan MD Mar 29, 2018 11:17

## 2018-03-29 NOTE — HHI.DS
__________________________________________________





Discharge Summary


Admission Date


Mar 27, 2018 at 14:52


Discharge Date:  Mar 29, 2018


Admitting Diagnosis





chest pain, history of coronary artery disease





Brief History - From Admission


89 years old male presented to the ED with sudden onset chest pain 10 out of 10 

with no transfer no dizziness no nausea or vomiting positive short of breath no 

diaphoresis, in general patient is extremely poor historian he always answer "I 

do not know".  Patient has a past medical history of coronary artery disease 

with stenting he reported having history of cancer but he was not sure where he 

does know it is in his had, possibly head and neck versus brain he also 

mentioned lung tumor. 


Patient denied any other symptoms such as abdominal pain diarrhea constipation 

dysuria urgency frequency orthopnea or PND





 by reviewing previous records from radiation therapy, he revealed patient 

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 

suspected to be metastatic from a previous skin cancer PET scan showed right 

upper lobe nodule medullary with an SUV due to previous PET scan on October 2017


CBC/BMP:  


3/27/18 1345                                                                   

             3/28/18 0920





Significant Findings





Laboratory Tests








Test


  3/27/18


13:45 3/27/18


15:28 3/27/18


21:22 3/28/18


03:09


 


Hematocrit


  38.2 %


(39.0-51.0) 


  


  


 


 


Neutrophils (%) (Auto)


  80.3 %


(16.0-70.0) 


  


  


 


 


Lymphocytes (%) (Auto)


  7.4 %


(9.0-44.0) 


  


  


 


 


Monocytes (%) (Auto)


  10.2 %


(0.0-8.0) 


  


  


 


 


Lymphocytes # (Auto)


  0.5 TH/MM3


(1.0-4.8) 


  


  


 


 


Prothrombin Time


  11.7 SEC


(9.8-11.6) 


  


  


 


 


Blood Urea Nitrogen


  21 MG/DL


(7-18) 


  


  


 


 


Chloride Level


  108 MEQ/L


() 


  


  


 


 


Estimat Glomerular Filtration


Rate 70 ML/MIN


(>89) 


  


  


 


 


Troponin I


  LESS THAN 0.02


NG/ML LESS THAN 0.02


NG/ML


 


Activated Partial


Thromboplast Time 


  


  54.8 SEC


(24.3-30.1) 46.2 SEC


(24.3-30.1)


 


LDL Cholesterol


  


  


  118 MG/DL


(0-99) 


 


 


HDL Cholesterol


  


  


  36.7 MG/DL


(40.0-60.0) 


 


 


Test


  3/28/18


09:20 3/29/18


07:55 


  


 


 


Blood Urea Nitrogen


  19 MG/DL


(7-18) 


  


  


 


 


Random Glucose


  132 MG/DL


() 


  


  


 


 


Chloride Level


  109 MEQ/L


() 


  


  


 


 


Estimat Glomerular Filtration


Rate 70 ML/MIN


(>89) 


  


  


 








Imaging





Last Impressions








Chest X-Ray 3/27/18 1329 Signed





Impressions: 





 Service Date/Time:  Tuesday, March 27, 2018 14:26 - CONCLUSION: No acute 





 disease.       Niall Castro Jr., MD 








PE at Discharge


awake and alert, oriented x 3


anicteric


lungs- clear


regular rhyhtm


abdomen soft, nontender


extremities no edema


neuro exam- unremarkable


Pt update on day of discharge


in SR


no chest pains or shortness of breath


headache resolved with wiping off nitrates


started on po Imdur


Hospital Course


89 years old male with history of disseminated cancer as per the patient, on 

chemotherapy brought to the ED with acute sharp chest pain





Chest pain syndrome with history of CAD with stent


Hypertension


Hyperlipidemia


History of paroxysmal a fib- in SR


-seen by Cardiology


- conservative management


- ASa/Plavix/Imdur/statins/CCB/amiodarone


- genevieve Hernandez- - continue coumadin


- OP ff up- per patient Coumadin comes and check his INR- q Monday


Headache- likelu from nitrates


- DC Nitrol paste- patient started on Imdur po


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 

therapy


Hypothyroidism


DVT prophylaxis- on coumadin ,- ambulating


Pt Condition on Discharge:  Stable


Discharge Disposition:  Disch w/ Home Health Serv


Discharge Time:  > 30 minutes


Discharge Instructions


DIET: Follow Instructions for:  Heart Healthy Diet, Coumadin (Warfarin) Diet


Speech Therapy-Diet Recommends:  Regular


Activities you can perform:  Weight Bearing as Jalil


Follow up Referrals:  


Cardiology - 1 Week with Sylvester Hernandez MD


PCP Follow-up - 3-5 Days with Dmitri





New Medications:  


Atorvastatin (Atorvastatin) 80 Mg Tab


80 MG PO DAILY for ACS for 30 Days, #30 TAB





Clopidogrel (Plavix) 75 Mg Tab


75 MG PO DAILY for ACS for 30 Days, #30 TAB 1 Refill





Isosorbide Mononitrate ER (Isosorbide Mononitrate ER) 30 Mg Jose


30 MG PO DAILY@07 for Chest Pain for 30 Days, #30 TAB





 


Continued Medications:  


Amiodarone (Amiodarone) 200 Mg Tab


200 MG PO DAILY for Regulate Heart Beat, #30 TAB 0 Refills





Aspirin (Aspirin) Unknown Strength Tab


1 TAB PO DAILY, #30 TAB 0 Refills





Diltiazem ER 24 HR (Diltiazem ER 24 HR) 240 Mg Jose


240 MG PO DAILY, #30 TAB 0 Refills





Levothyroxine (Levothyroxine) 75 Mcg Tab


75 MCG PO DAILY for Thyroid, #30 TAB 0 Refills





Warfarin (Warfarin) 5 Mg Tab


5 MG PO DAILY for Blood Clot Prevention, #30 TAB 0 Refills





 


Discontinued Medications:  


Amlodipine-Valsartan (Amlodipine-Valsartan)  Mg Tab


1 TAB PO DAILY for Blood Pressure Management, #30 TAB 0 Refills

















Mark Hagan MD Mar 29, 2018 11:12

## 2018-03-29 NOTE — HHI.PR
Subjective


Remarks


telemetry - in SR


no chest pain ro shortness of breath


complains of headaches





Objective


Vitals





Vital Signs








  Date Time  Temp Pulse Resp B/P (MAP) Pulse Ox O2 Delivery O2 Flow Rate FiO2


 


3/29/18 09:42      Room Air  


 


3/29/18 08:08 97.3 54 18 125/61 (82) 97   


 


3/29/18 04:00 97.3 61 20 156/81 (106) 98   


 


3/29/18 00:00 97.6 52 20 113/62 (79) 96   


 


3/28/18 20:00      Room Air  


 


3/28/18 20:00 98.3 57 20 128/62 (84) 93   


 


3/28/18 16:01  55      


 


3/28/18 16:00 97.7 59 18 116/59 (78) 96   


 


3/28/18 12:00 97.8 60 18 108/59 (75) 95   


 


3/28/18 11:56  54      














I/O      


 


 3/28/18 3/28/18 3/28/18 3/29/18 3/29/18 3/29/18





 07:00 15:00 23:00 07:00 15:00 23:00


 


Intake Total 120 ml  360 ml 200 ml  


 


Output Total 350 ml  525 ml 50 ml  


 


Balance -230 ml  -165 ml 150 ml  


 


      


 


Intake Oral 120 ml  360 ml 200 ml  


 


Output Urine Total 350 ml  525 ml 50 ml  


 


# Bowel Movements 0  0 0  








Result Diagram:  


3/27/18 1345                                                                   

             3/28/18 0920





Imaging





Last Impressions








Chest X-Ray 3/27/18 1329 Signed





Impressions: 





 Service Date/Time:  Tuesday, March 27, 2018 14:26 - CONCLUSION: No acute 





 disease.       Niall Castro Jr., MD 








Objective Remarks


awake and alert, oriented x 3


anicteric


lungs- clear


regular rhyhtm


abdomen soft, nontender


extremities no edema


neuro exam- unremarkable





A/P


Assessment and Plan


89 years old male with history of disseminated cancer as per the patient, on 

chemotherapy brought to the ED with acute sharp chest pain





Chest pain syndrome with history of CAD with stent


Hypertension


Hyperlipidemia


History of paroxysmal a fib- in SR


-seen by Cardiology


- conservative management


- ASa/Plavix/Imdur/statins/CCB/amiodarone


- will s/w Dr. Hernandez- regarding - coumadin


- OP ff up- per patient Coumadin comes and check his INR- q Monday


Headache- likelu from nitrates


- DC Nitrol paste- patient started on Imdur po


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 

therapy


Hypothyroidism


DVT prophylax,- ambulating











Mark Hagan MD Mar 29, 2018 10:39

## 2018-04-18 ENCOUNTER — HOSPITAL ENCOUNTER (OUTPATIENT)
Dept: HOSPITAL 17 - NEPE | Age: 83
Setting detail: OBSERVATION
LOS: 6 days | Discharge: NURSING, REHAB OR LONG-TERM CARE | End: 2018-04-24
Attending: HOSPITALIST | Admitting: HOSPITALIST
Payer: MEDICARE

## 2018-04-18 VITALS — SYSTOLIC BLOOD PRESSURE: 131 MMHG | DIASTOLIC BLOOD PRESSURE: 68 MMHG

## 2018-04-18 VITALS
TEMPERATURE: 97.5 F | SYSTOLIC BLOOD PRESSURE: 100 MMHG | HEART RATE: 63 BPM | OXYGEN SATURATION: 98 % | RESPIRATION RATE: 19 BRPM | DIASTOLIC BLOOD PRESSURE: 56 MMHG

## 2018-04-18 VITALS
HEART RATE: 65 BPM | OXYGEN SATURATION: 96 % | RESPIRATION RATE: 17 BRPM | TEMPERATURE: 98.4 F | DIASTOLIC BLOOD PRESSURE: 79 MMHG | SYSTOLIC BLOOD PRESSURE: 162 MMHG

## 2018-04-18 VITALS
HEART RATE: 62 BPM | DIASTOLIC BLOOD PRESSURE: 61 MMHG | RESPIRATION RATE: 19 BRPM | SYSTOLIC BLOOD PRESSURE: 107 MMHG | OXYGEN SATURATION: 98 %

## 2018-04-18 VITALS — HEIGHT: 71 IN | WEIGHT: 176.37 LBS | BODY MASS INDEX: 24.69 KG/M2

## 2018-04-18 VITALS
RESPIRATION RATE: 16 BRPM | OXYGEN SATURATION: 97 % | DIASTOLIC BLOOD PRESSURE: 61 MMHG | HEART RATE: 60 BPM | SYSTOLIC BLOOD PRESSURE: 123 MMHG

## 2018-04-18 VITALS — OXYGEN SATURATION: 98 %

## 2018-04-18 VITALS
RESPIRATION RATE: 17 BRPM | DIASTOLIC BLOOD PRESSURE: 88 MMHG | SYSTOLIC BLOOD PRESSURE: 199 MMHG | HEART RATE: 68 BPM | OXYGEN SATURATION: 96 % | TEMPERATURE: 98.2 F

## 2018-04-18 DIAGNOSIS — E78.5: ICD-10-CM

## 2018-04-18 DIAGNOSIS — Z92.3: ICD-10-CM

## 2018-04-18 DIAGNOSIS — Z79.01: ICD-10-CM

## 2018-04-18 DIAGNOSIS — I10: ICD-10-CM

## 2018-04-18 DIAGNOSIS — C79.2: ICD-10-CM

## 2018-04-18 DIAGNOSIS — I25.10: ICD-10-CM

## 2018-04-18 DIAGNOSIS — Z95.5: ICD-10-CM

## 2018-04-18 DIAGNOSIS — E86.0: ICD-10-CM

## 2018-04-18 DIAGNOSIS — K21.9: ICD-10-CM

## 2018-04-18 DIAGNOSIS — G93.41: ICD-10-CM

## 2018-04-18 DIAGNOSIS — N17.9: ICD-10-CM

## 2018-04-18 DIAGNOSIS — I48.2: ICD-10-CM

## 2018-04-18 DIAGNOSIS — Z82.49: ICD-10-CM

## 2018-04-18 DIAGNOSIS — R41.82: ICD-10-CM

## 2018-04-18 DIAGNOSIS — C34.90: Primary | ICD-10-CM

## 2018-04-18 LAB
ALBUMIN SERPL-MCNC: 3.2 GM/DL (ref 3.4–5)
ALP SERPL-CCNC: 137 U/L (ref 45–117)
ALT SERPL-CCNC: 16 U/L (ref 12–78)
AST SERPL-CCNC: 16 U/L (ref 15–37)
BASOPHILS # BLD AUTO: 0 TH/MM3 (ref 0–0.2)
BASOPHILS NFR BLD: 0.6 % (ref 0–2)
BILIRUB SERPL-MCNC: 0.6 MG/DL (ref 0.2–1)
BUN SERPL-MCNC: 30 MG/DL (ref 7–18)
CALCIUM SERPL-MCNC: 8.7 MG/DL (ref 8.5–10.1)
CHLORIDE SERPL-SCNC: 108 MEQ/L (ref 98–107)
CREAT SERPL-MCNC: 1.74 MG/DL (ref 0.6–1.3)
EOSINOPHIL # BLD: 0.1 TH/MM3 (ref 0–0.4)
EOSINOPHIL NFR BLD: 1.1 % (ref 0–4)
ERYTHROCYTE [DISTWIDTH] IN BLOOD BY AUTOMATED COUNT: 15.3 % (ref 11.6–17.2)
GFR SERPLBLD BASED ON 1.73 SQ M-ARVRAT: 37 ML/MIN (ref 89–?)
GLUCOSE SERPL-MCNC: 111 MG/DL (ref 74–106)
HCO3 BLD-SCNC: 22.5 MEQ/L (ref 21–32)
HCT VFR BLD CALC: 33.3 % (ref 39–51)
HGB BLD-MCNC: 11.3 GM/DL (ref 13–17)
INR PPP: 2.4 RATIO
LYMPHOCYTES # BLD AUTO: 0.4 TH/MM3 (ref 1–4.8)
LYMPHOCYTES NFR BLD AUTO: 4.9 % (ref 9–44)
MCH RBC QN AUTO: 29 PG (ref 27–34)
MCHC RBC AUTO-ENTMCNC: 33.9 % (ref 32–36)
MCV RBC AUTO: 85.5 FL (ref 80–100)
MONOCYTE #: 0.5 TH/MM3 (ref 0–0.9)
MONOCYTES NFR BLD: 6.7 % (ref 0–8)
NEUTROPHILS # BLD AUTO: 6.2 TH/MM3 (ref 1.8–7.7)
NEUTROPHILS NFR BLD AUTO: 86.7 % (ref 16–70)
PLATELET # BLD: 239 TH/MM3 (ref 150–450)
PMV BLD AUTO: 7.3 FL (ref 7–11)
PROT SERPL-MCNC: 7.1 GM/DL (ref 6.4–8.2)
PROTHROMBIN TIME: 24.5 SEC (ref 9.8–11.6)
RBC # BLD AUTO: 3.89 MIL/MM3 (ref 4.5–5.9)
SODIUM SERPL-SCNC: 140 MEQ/L (ref 136–145)
WBC # BLD AUTO: 7.1 TH/MM3 (ref 4–11)

## 2018-04-18 PROCEDURE — G0378 HOSPITAL OBSERVATION PER HR: HCPCS

## 2018-04-18 PROCEDURE — 80307 DRUG TEST PRSMV CHEM ANLYZR: CPT

## 2018-04-18 PROCEDURE — 93005 ELECTROCARDIOGRAM TRACING: CPT

## 2018-04-18 PROCEDURE — A9577 INJ MULTIHANCE: HCPCS

## 2018-04-18 PROCEDURE — 82550 ASSAY OF CK (CPK): CPT

## 2018-04-18 PROCEDURE — 85025 COMPLETE CBC W/AUTO DIFF WBC: CPT

## 2018-04-18 PROCEDURE — 71045 X-RAY EXAM CHEST 1 VIEW: CPT

## 2018-04-18 PROCEDURE — 96361 HYDRATE IV INFUSION ADD-ON: CPT

## 2018-04-18 PROCEDURE — 97110 THERAPEUTIC EXERCISES: CPT

## 2018-04-18 PROCEDURE — 96374 THER/PROPH/DIAG INJ IV PUSH: CPT

## 2018-04-18 PROCEDURE — 85610 PROTHROMBIN TIME: CPT

## 2018-04-18 PROCEDURE — 80053 COMPREHEN METABOLIC PANEL: CPT

## 2018-04-18 PROCEDURE — 80162 ASSAY OF DIGOXIN TOTAL: CPT

## 2018-04-18 PROCEDURE — 84484 ASSAY OF TROPONIN QUANT: CPT

## 2018-04-18 PROCEDURE — 71260 CT THORAX DX C+: CPT

## 2018-04-18 PROCEDURE — 70553 MRI BRAIN STEM W/O & W/DYE: CPT

## 2018-04-18 PROCEDURE — 99285 EMERGENCY DEPT VISIT HI MDM: CPT

## 2018-04-18 PROCEDURE — 97163 PT EVAL HIGH COMPLEX 45 MIN: CPT

## 2018-04-18 PROCEDURE — 70450 CT HEAD/BRAIN W/O DYE: CPT

## 2018-04-18 RX ADMIN — PHENYTOIN SODIUM SCH MLS/HR: 50 INJECTION INTRAMUSCULAR; INTRAVENOUS at 16:43

## 2018-04-18 RX ADMIN — ATORVASTATIN CALCIUM SCH MG: 20 TABLET, FILM COATED ORAL at 20:57

## 2018-04-18 RX ADMIN — Medication SCH ML: at 20:52

## 2018-04-18 NOTE — RADRPT
EXAM DATE/TIME:  04/18/2018 21:43 

 

[facility] COMPARISON:     

CT BRAIN W/O CONTRAST, April 18, 2018, 14:26.

       

 

 

INDICATIONS :     

***Altered mental status. Left sided weakness 

                     

 

CONTRAST:     

16 cc Multihance (gadobenate) IV

                     

 

MEDICAL HISTORY :           

Cardiovascular disease. Carcinoma, lung. Hypertension

 

SURGICAL HISTORY :           

Cervical Fusion

 

ENCOUNTER:     

Initial

 

ACUITY:     

1 day

 

PAIN SCORE:     

0/10

 

LOCATION:         

Brain

 

TECHNIQUE:     

Multiplanar, multisequence MRI of the brain was performed both prior to and following the administrat
ion of paramagnetic contrast.

 

FINDINGS:     

 

CEREBRUM:     

The ventricles are normal for age.  No evidence of midline shift, mass lesion, hemorrhage or acute in
farction.  No extraaxial fluid collections are seen.  The pituitary gland and suprasellar cistern are
 normal in configuration.

 

WHITE MATTER:          

Mild white matter T2 prolongation which appears benign.

 

POSTERIOR FOSSA:     

The cerebellum and brainstem are intact.  The 4th ventricle is midline. The cerebellopontine angle is
 unremarkable.  The cerebellar tonsils are normal in position.

 

DIFFUSION IMAGING:     

No focal areas of restricted diffusion are seen.  No evidence of acute infarction.

 

EXTRACRANIAL:     

Moderate mucosal disease present in the right frontal sinus and left maxillary antrum. Slight less th
an 2 cm cystic mass present in the left parotid gland.

 

POST-CONTRAST:     

No abnormal areas of parenchymal or dural enhancement.  No evidence of blood-brain barrier breakdown.


 

CONCLUSION:     

No acute intracranial findings.

Cystic left parotid mass

 

 

 

 Sixto Arreola MD on April 18, 2018 at 23:00           

Board Certified Radiologist.

 This report was verified electronically.

## 2018-04-18 NOTE — RADRPT
EXAM DATE/TIME:  04/18/2018 14:26 

 

[facility] COMPARISON:     

No previous studies available for comparison.

 

 

INDICATIONS :     

Headache and dizziness

                      

 

RADIATION DOSE:     

56.35 CTDIvol (mGy) 

 

 

 

MEDICAL HISTORY :     

Carcinoma, lung. Hypertension. 

 

SURGICAL HISTORY :      

Fusion, cervical. 

 

ENCOUNTER:      

Initial

 

ACUITY:      

1 day

 

PAIN SCALE:      

10/10

 

LOCATION:        

cranial 

 

TECHNIQUE:     

Multiple contiguous axial images were obtained of the head.  Using automated exposure control and adj
ustment of the mA and/or kV according to patient size, radiation dose was kept as low as reasonably a
chievable to obtain optimal diagnostic quality images.   DICOM format image data is available electro
nically for review and comparison.  

 

FINDINGS:     

The ventricles are symmetric and normal in appearance. No abnormal extra-axial fluid accumulation is 
identified. There is no evidence of intracranial hemorrhage or mass. There is nothing to suggest acut
e infarction. There is complete or near complete opacification of right frontal sinus, and left maxil
salvatore sinus.

 

CONCLUSION:     

No acute intracranial findings

 

 

 

 Sixto Arreola MD on April 18, 2018 at 14:40           

Board Certified Radiologist.

 This report was verified electronically.

## 2018-04-18 NOTE — RADRPT
EXAM DATE/TIME:  04/18/2018 17:49 

 

[facility] COMPARISON:     

No previous studies available for comparison.

 

 

INDICATIONS :     

History of lung cancer, evaluate for lung metastasis.

                      

 

IV CONTRAST:     

75 cc Visipaque (iodixanol) IV 

 

 

RADIATION DOSE:     

6.66 CTDIvol (mGy) 

 

 

MEDICAL HISTORY :     

Cardiovascular disease. Carcinoma, lung. Hypertension.

 

SURGICAL HISTORY :      

Fusion, cervical. 

 

ENCOUNTER:      

Initial

 

ACUITY:      

2 days

 

PAIN SCALE:      

5/10

 

LOCATION:        

chest 

 

TECHNIQUE:      

Volumetric scanning of the chest was performed.  Using automated exposure control and adjustment of t
he mA and/or kV according to patient size, radiation dose was kept as low as reasonably achievable to
 obtain optimal diagnostic quality images.   DICOM format image data is available electronically for 
review and comparison.  

 

Follow-up recommendations for detected pulmonary nodules are based at a minimum on nodule size and pa
tient risk factors according to Fleischner Society Guidelines.

 

FINDINGS:     

 

LUNGS:     

There are multiple bilateral pulmonary parenchymal nodules identified, largest a roughly 1 cm nodule 
in the anterior left lung apex with smaller nodules present elsewhere in both lungs. There is minimal
 probable atelectasis in the posterior lung bases.

 

PLEURA:     

There is no pleural thickening or pleural effusion.

 

MEDIASTINUM:     

The heart and great vessels demonstrate no acute abnormality.  There is no mediastinal or hilar lymph
adenopathy. Atherosclerotic calcifications, including within the coronaries. Prominent left hilar lym
ph nodes, largest approaching 2 cm. Small hiatal hernia.

 

AXILLAE:     

Within normal limits.  No lymphadenopathy.

 

SKELETAL:     

Within normal limits for patient age.

 

MISCELLANEOUS:     

Left renal cysts.

 

CONCLUSION:     

Bilateral pulmonary nodules. Left hilar lymphadenopathy.

 

 

 

 Sixto Arreola MD on April 18, 2018 at 18:21           

Board Certified Radiologist.

 This report was verified electronically.

## 2018-04-18 NOTE — RADRPT
EXAM DATE/TIME:  04/18/2018 12:47 

 

[facility] COMPARISON:     

No previous studies available for comparison.

 

                     

INDICATIONS :     

Syncope.

                     

 

MEDICAL HISTORY :     

Hypertension.  Carcinoma, lung.  Hypercholesterolemia.      

 

SURGICAL HISTORY :        

Cardiac stents

 

ENCOUNTER:     

Initial                                        

 

ACUITY:     

1 day      

 

PAIN SCORE:     

0/10

 

LOCATION:     

Bilateral chest 

 

FINDINGS:     

A single view of the chest demonstrates the lungs to be symmetrically aerated without evidence of mas
s, infiltrate or effusion.  The cardiomediastinal contours are unremarkable.  Osseous structures are 
intact.

 

CONCLUSION:     No acute disease.  

 

 

 

 Stefano Christensen MD FACR on April 18, 2018 at 13:07           

Board Certified Radiologist.

 This report was verified electronically.

## 2018-04-18 NOTE — PD
HPI


Chief Complaint:  Altered Mental Status


Time Seen by Provider:  12:25


Travel History


International Travel<30 days:  No


Contact w/Intl Traveler<30days:  No


Traveled to known affect area:  No





History of Present Illness


HPI


Is an 89-year-old man who presents to the emergency department complaining of 

chest pain.  Confusion and dizziness.  He states that he feels very confused.  

He feels like he cannot "sort things out".  He does describe a spinning feeling 

and describes that when the symptoms overwhelmed him this morning he had to 

crawl on the floor to come out of his bedroom.  He denies dizziness or vertigo 

now, but seems to have some difficulty answering questions.  He lives at an 

adult facility but is an independent living.  He normally drives, his car is 

apparently stranded in our oncology center because is not able to drive for the 

past several days.  He reports he was just admitted to [facility] and was discharged yesterday.  States he normally walks with a walker 

or cane, uses a wheelchair at times, but is otherwise independent.  He is a 

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 

as well as a new long cancer for which he is receiving radiation therapy to the 

long.  He is not on chemotherapy right now.  He also has a history of 

hypertension, hyperlipidemia, CAD, GERD, and A. fib.





History


Past Medical History


*** Narrative Medical


Hypertension


Hyperlipidemia


CAD


GERD


A. fib


Lung cancer, squamous cell cancer in the neck


Tetanus Vaccination:  < 5 Years


Influenza Vaccination:  Yes





Social History


Alcohol Use:  Yes (WINE DAILY)


Tobacco Use:  No





Allergies-Medications


(Allergen,Severity, Reaction):  


Coded Allergies:  


     No Known Allergies (Verified  Allergy, Unknown, 4/18/18)


Reported Meds & Prescriptions





Reported Meds & Active Scripts


Active


Isosorbide Mononitrate ER (Isosorbide Mononitrate) 30 Mg Jose 30 Mg PO DAILY@

07 30 Days


Plavix (Clopidogrel Bisulfate) 75 Mg Tab 75 Mg PO DAILY 30 Days


Reported


Aciphex (Rabeprazole Sodium) 20 Mg Tab 20 Mg PO DAILY


Diovan (Valsartan) 160 Mg Tab 160 Mg PO DAILY


Nitroglycerin SL (Nitroglycerin) 0.4 Mg Subl 0.4 Mg SL AS DIRECTED PRN


     ONE TABLET UNDER THE TONGUE AS NEEDED FOR CHEST PAIN, MAY REPEAT EVERY


     FIVE MINUTES FOR A TOTAL OF 3 DOSES OR CALL 911 IF NO RELIEF


Lipitor (Atorvastatin Calcium) 20 Mg Tab 20 Mg PO HS


Amlodipine (Amlodipine Besylate) 5 Mg Tab 5 Mg PO DAILY


Norco (Hydrocodone-Acetaminophen) 5 Mg-325 Mg Tab 1 Tab PO Q4H PRN


Amiodarone (Amiodarone HCl) 200 Mg Tab 200 Mg PO DAILY


Levothyroxine (Levothyroxine Sodium) 75 Mcg Tab 75 Mcg PO DAILY


Warfarin 5 Mg Tab 5 Mg PO DAILY


Aspirin Unknown Strength Tab 1 Tab PO DAILY








Review of Systems


Except as stated in HPI:  all other systems reviewed are Neg





Physical Exam


Narrative


GENERAL: An 89-year-old man, no acute distress.


SKIN: Focused skin assessment warm/dry.


HEAD: Atraumatic. Normocephalic. 


EYES: Pupils equal and round. No scleral icterus. No injection or drainage. 


ENT: No nasal bleeding or discharge.  Mucous membranes pink and moist.


NECK: Trachea midline. No JVD. 


CARDIOVASCULAR: Regular rate and rhythm.  No murmur appreciated.


RESPIRATORY: No accessory muscle use. Clear to auscultation. Breath sounds 

equal bilaterally. 


GASTROINTESTINAL: Abdomen soft, non-tender, nondistended. Hepatic and splenic 

margins not palpable. 


MUSCULOSKELETAL: No obvious deformities. No clubbing.  No cyanosis.  No edema. 


NEUROLOGICAL: Awake and alert, but with confusion.  Finger to nose is a little 

bit unsteady.  No obvious cranial nerve deficits.  Motor grossly within normal 

limits.  Gait is unsteady, requiring two-person assist.  Normal speech.


PSYCHIATRIC: Confused, directable, anxious.





Data


Data


Last Documented VS





Vital Signs








  Date Time  Temp Pulse Resp B/P (MAP) Pulse Ox O2 Delivery O2 Flow Rate FiO2


 


4/18/18 12:33     98 Room Air  


 


4/18/18 12:14 97.5 63 19 100/56 (71)    








Orders





 Orders


Electrocardiogram (4/18/18 12:28)


Complete Blood Count With Diff (4/18/18 12:28)


Comprehensive Metabolic Panel (4/18/18 12:28)


Urinalysis - C+S If Indicated (4/18/18 12:28)


Chest, Single Ap (4/18/18 12:28)


Ct Brain W/O Iv Contrast(Rout) (4/18/18 12:28)


Blood Glucose (4/18/18 12:28)


Ecg Monitoring (4/18/18 12:28)


Iv Access Insert/Monitor (4/18/18 12:28)


Oximetry (4/18/18 12:28)


Sodium Chloride 0.9% Flush (Ns Flush) (4/18/18 12:30)


Sodium Chlor 0.9% 1000 Ml Inj (Ns 1000 M (4/18/18 12:28)


Alcohol (Ethanol) (4/18/18 12:28)


Digoxin (4/18/18 12:42)


Admit Order (Ed Use Only) (4/18/18 )





Labs





Laboratory Tests








Test


  4/18/18


12:30


 


White Blood Count 7.1 TH/MM3 


 


Red Blood Count 3.89 MIL/MM3 


 


Hemoglobin 11.3 GM/DL 


 


Hematocrit 33.3 % 


 


Mean Corpuscular Volume 85.5 FL 


 


Mean Corpuscular Hemoglobin 29.0 PG 


 


Mean Corpuscular Hemoglobin


Concent 33.9 % 


 


 


Red Cell Distribution Width 15.3 % 


 


Platelet Count 239 TH/MM3 


 


Mean Platelet Volume 7.3 FL 


 


Neutrophils (%) (Auto) 86.7 % 


 


Lymphocytes (%) (Auto) 4.9 % 


 


Monocytes (%) (Auto) 6.7 % 


 


Eosinophils (%) (Auto) 1.1 % 


 


Basophils (%) (Auto) 0.6 % 


 


Neutrophils # (Auto) 6.2 TH/MM3 


 


Lymphocytes # (Auto) 0.4 TH/MM3 


 


Monocytes # (Auto) 0.5 TH/MM3 


 


Eosinophils # (Auto) 0.1 TH/MM3 


 


Basophils # (Auto) 0.0 TH/MM3 


 


CBC Comment DIFF FINAL 


 


Differential Comment  


 


Blood Urea Nitrogen 30 MG/DL 


 


Creatinine 1.74 MG/DL 


 


Random Glucose 111 MG/DL 


 


Total Protein 7.1 GM/DL 


 


Albumin 3.2 GM/DL 


 


Calcium Level 8.7 MG/DL 


 


Alkaline Phosphatase 137 U/L 


 


Aspartate Amino Transf


(AST/SGOT) 16 U/L 


 


 


Alanine Aminotransferase


(ALT/SGPT) 16 U/L 


 


 


Total Bilirubin 0.6 MG/DL 


 


Sodium Level 140 MEQ/L 


 


Potassium Level 3.8 MEQ/L 


 


Chloride Level 108 MEQ/L 


 


Carbon Dioxide Level 22.5 MEQ/L 


 


Anion Gap 10 MEQ/L 


 


Estimat Glomerular Filtration


Rate 37 ML/MIN 


 


 


Ethyl Alcohol Level


  LESS THAN 3


MG/DL











MDM


Medical Decision Making


Medical Screen Exam Complete:  Yes


Emergency Medical Condition:  Yes


Interpretation(s)


My review of EKG: Normal sinus rhythm at a rate of 66, sleeping ST depressions 

throughout, unclear etiology.  Possible dig effect.  Normal axis, right bundle 

branch block.


Differential Diagnosis


Metastatic malignancy, infection, confusion, delirium, adverse medication effect

, other


Narrative Course


Medical decision making





89-year-old male with confusion unsteadiness and complaints of vertigo.  

Difficult history.  Has metastatic malignancy.  I do not see any imaging of the 

head.  Concern for intracranial metastasis.  Will check labs, CT, x-ray.  We 

will try to get records from [facility].





FINAL: New vertigo, altered mental status, confusion.  Etiology is unclear.  

Will need admission for further evaluation.  Likely need MRI given malignancy.





Diagnosis





 Primary Impression:  


 Altered mental status





Admitting Information


Admitting Physician Requests:  Admit











Frederick Mays MD Apr 18, 2018 13:04

## 2018-04-18 NOTE — MB
cc:

Angel Luis Issa MDManisha

****

 

 

DATE:

04/18/2018

 

CHIEF COMPLAINT AND HISTORY OF PRESENT ILLNESS:

This is an 89-year-old gentleman, known to me.  He has received 

previous radiation treatment for skin metastases to lymphadenopathy in

his neck.  He responded to treatment well and unfortunately developed 

lung lesions.  He has received stereotactic radiation treatment to one

lesion, but over the past several weeks, has not been able to proceed 

with treatment.  In the past 2 weeks, he was admitted to [facility] and underwent coronary artery stenting.  He has a history of 

atrial fibrillation and weakness.

 

He comes in today having problems with dizziness, unable to walk and 

extreme fatigue.  He has had a CT scan of his head in the emergency 

room, which showed no specific abnormality and a chest x-ray, which 

was otherwise unremarkable.

 

Today, at this moment, he is alert and oriented.  He is lying 

comfortably in bed.  His vital no specific abnormalities.  He was 

afebrile, pulse of 63 and regular, respiratory rate of 19, blood 

pressure of 100/56 and his O2 saturation on room air was 98%.

 

PAST MEDICAL AND SURGICAL HISTORY:

1.  Atrial fibrillation.

2.  Coronary artery disease with recent coronary artery stenting at 

[facility].

3.  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 

metastatic to the soft tissues of his head and neck.

4.  Arthritis.

5.  Hypertension.

6.  Previous cataract surgery.

7.  Cervical fusion.

 

ALLERGIES:

NONE KNOWN.

 

SOCIAL HISTORY:

This gentleman lives his wife in a care center where there is some 

support, but they are independent.

 

PHYSICAL EXAMINATION:

GENERAL:  He is alert and oriented.

VITAL SIGNS:  Stable as noted.

HEAD AND NECK:  There is no adenopathy.

NECK:  Lung fields were clear.

HEART:  Sounds are normal.

ABDOMEN:  No abdominal masses or tenderness.

EXTREMITIES:  He is moving all limbs normally.

 

ASSESSMENT AND PLAN:

This gentleman is elderly and frail.  I believe he is having recurrent

cardiac symptoms which may be intermittent and is possibly related to 

his atrial fibrillation; however, this is not clear.

 

He has complained of some left-sided headaches, and I would suggest 

that he have an MRI of his brain with and without contrast to rule out

metastases, as he does have metastases to his lung as well.  He has 

had intermittent pulmonary infections.  He has not been able to 

proceed recently with radiation treatment.  I think it would be of 

value to assess his lung, so I would also suggest a CT scan of his 

chest.  Otherwise, of course, he will require ongoing observation and 

we will continue to follow him while in the hospital.

 

 

__________________________________

MD LILO Perez/LANDEN

D: 04/18/2018, 05:10 PM

T: 04/18/2018, 05:36 PM

Visit #: [id number]

Job #: [id number]

## 2018-04-18 NOTE — HHI.HP
__________________________________________________





HPI


Service


[facility]


Primary Care Physician


Manisha Webb D.O.


Admission Diagnosis





Altered mental status, confusion


Diagnoses:  


(1) ARF (acute renal failure)


(2) Metabolic encephalopathy


Chief Complaint:  


Confusion


Travel History


International Travel<30 Days:  No


Contact w/Intl Traveler <30 Da:  No


Traveled to Known Affected Are:  No


History of Present Illness


89-year-old man with history of lung cancer, atrial fibrillation presents to 

the ED for evaluation of worsening symptoms of confusion and dizziness.  

Patient was released yesterday from an outside hospital where he has been 

treated for chest pain 2 days.  Patient reports that this morning he woke up 

and was able to have coffee and step out of bed, however had to return back to 

bed because he was not feeling well.  He describes a spinning feeling and 

states this episode was so overwhelmed that patient could not get out of bed on 

his own and had to crawl on the floor out of his bedroom.  Head CT in the ED 

was without any evidence of intracranial abnormality.  Patient has a history of 

squamous cell lung cancer with metastasis for which he is currently receiving 

radiation therapy, however missed one session yesterday.  During my exam, 

patient was alert and oriented 3.  He denies any bladder or bowel dysfunction.





Review of Systems


Except as stated in HPI:  all other systems reviewed are Neg





Past Family Social History


Past Medical History


 A. fib


Lung cancer, questionable head and neck cancer


Arthritis


Hypertension


Port implanted He is on chemotherapy


Patient also mentioned history of left pleural effusion





Past Surgical History


Cataract


Cervical fusion


Reported Medications


Isosorbide Mononitrate ER (Isosorbide Mononitrate) 30 Mg Jose 30 Mg PO DAILY@

07 30 Days


Plavix (Clopidogrel Bisulfate) 75 Mg Tab 75 Mg PO DAILY 30 Days


Reported


Aciphex (Rabeprazole Sodium) 20 Mg Tab 20 Mg PO DAILY


Diovan (Valsartan) 160 Mg Tab 160 Mg PO DAILY


Nitroglycerin SL (Nitroglycerin) 0.4 Mg Subl 0.4 Mg SL AS DIRECTED PRN


     ONE TABLET UNDER THE TONGUE AS NEEDED FOR CHEST PAIN, MAY REPEAT EVERY


     FIVE MINUTES FOR A TOTAL OF 3 DOSES OR CALL 911 IF NO RELIEF


Lipitor (Atorvastatin Calcium) 20 Mg Tab 20 Mg PO HS


Amlodipine (Amlodipine Besylate) 5 Mg Tab 5 Mg PO DAILY


Norco (Hydrocodone-Acetaminophen) 5 Mg-325 Mg Tab 1 Tab PO Q4H PRN


Amiodarone (Amiodarone HCl) 200 Mg Tab 200 Mg PO DAILY


Levothyroxine (Levothyroxine Sodium) 75 Mcg Tab 75 Mcg PO DAILY


Warfarin 5 Mg Tab 5 Mg PO DAILY


Aspirin Unknown Strength Tab 1 Tab PO DAILY


Allergies:  


Coded Allergies:  


     No Known Allergies (Verified  Allergy, Unknown, 18)


Family History


Mother  with heart attack, father had a brain tumor





Social History


No tobacco or illicit drug abuse, patient admitted drinking wine every day





Physical Exam


Vital Signs





Vital Signs








  Date Time  Temp Pulse Resp B/P (MAP) Pulse Ox O2 Delivery O2 Flow Rate FiO2


 


18 12:33     98 Room Air  


 


18 12:14 97.5 63 19 100/56 (71) 98   








Physical Exam


GENERAL: This is a well-nourished, well-developed patient, in no apparent 

distress.


SKIN: No rashes, ecchymoses or lesions. Cool and dry.


HEAD: Atraumatic. Normocephalic. No temporal or scalp tenderness.


EYES: Pupils equal round and reactive. Extraocular motions intact. No scleral 

icterus. No injection or drainage. 


ENT: Nose without bleeding, purulent drainage or septal hematoma. Throat 

without erythema, tonsillar hypertrophy or exudate. Uvula midline. Airway 

patent.


NECK: Trachea midline. No JVD or lymphadenopathy. Supple, nontender, no 

meningeal signs.


CARDIOVASCULAR: Regular rate and rhythm without murmurs, gallops, or rubs. 


RESPIRATORY: Clear to auscultation. Breath sounds equal bilaterally. No wheezes

, rales, or rhonchi.  


GASTROINTESTINAL: Abdomen soft, non-tender, nondistended. No hepato-splenomegaly

, or palpable masses. No guarding.


MUSCULOSKELETAL: Extremities without clubbing, cyanosis, or edema. No joint 

tenderness, effusion, or edema noted. No calf tenderness. Negative Homans sign 

bilaterally.


NEUROLOGICAL: Awake and alert. Cranial nerves II through XII intact.  Motor and 

sensory grossly within normal limits. Five out of 5 muscle strength in all 

muscle groups.  Normal speech.


Laboratory





Laboratory Tests








Test


  18


12:30


 


White Blood Count 7.1 


 


Red Blood Count 3.89 


 


Hemoglobin 11.3 


 


Hematocrit 33.3 


 


Mean Corpuscular Volume 85.5 


 


Mean Corpuscular Hemoglobin 29.0 


 


Mean Corpuscular Hemoglobin


Concent 33.9 


 


 


Red Cell Distribution Width 15.3 


 


Platelet Count 239 


 


Mean Platelet Volume 7.3 


 


Neutrophils (%) (Auto) 86.7 


 


Lymphocytes (%) (Auto) 4.9 


 


Monocytes (%) (Auto) 6.7 


 


Eosinophils (%) (Auto) 1.1 


 


Basophils (%) (Auto) 0.6 


 


Neutrophils # (Auto) 6.2 


 


Lymphocytes # (Auto) 0.4 


 


Monocytes # (Auto) 0.5 


 


Eosinophils # (Auto) 0.1 


 


Basophils # (Auto) 0.0 


 


CBC Comment DIFF FINAL 


 


Differential Comment  


 


Blood Urea Nitrogen 30 


 


Creatinine 1.74 


 


Random Glucose 111 


 


Total Protein 7.1 


 


Albumin 3.2 


 


Calcium Level 8.7 


 


Alkaline Phosphatase 137 


 


Aspartate Amino Transf


(AST/SGOT) 16 


 


 


Alanine Aminotransferase


(ALT/SGPT) 16 


 


 


Total Bilirubin 0.6 


 


Sodium Level 140 


 


Potassium Level 3.8 


 


Chloride Level 108 


 


Carbon Dioxide Level 22.5 


 


Anion Gap 10 


 


Estimat Glomerular Filtration


Rate 37 


 


 


Ethyl Alcohol Level LESS THAN 3 








Result Diagram:  


18 1230                                                                   

             18 1230





Imaging





Last Impressions








Head CT 18 1228 Signed





Impressions: 





 Service Date/Time:  2018 14:26 - CONCLUSION:  No acute 





 intracranial findings     Sixto Arreola MD 


 


Chest X-Ray 188 Signed





Impressions: 





 Service Date/Time:  2018 12:47 - CONCLUSION: No acute 





 disease.       Stefano Christensen MD  FACR











Septic Shock Reassessment


Septic shock perfusion:  reassessment completed





Caprini VTE Risk Assessment


Caprini VTE Risk Assessment:  Mod/High Risk (score >= 2)


Caprini Risk Assessment Model











 Point Value = 1          Point Value = 2  Point Value = 3  Point Value = 5


 


Age 41-60


Minor surgery


BMI > 25 kg/m2


Swollen legs


Varicose veins


Pregnancy or postpartum


History of unexplained or recurrent


   spontaneous 


Oral contraceptives or hormone


   replacement


Sepsis (< 1 month)


Serious lung disease, including


   pneumonia (< 1 month)


Abnormal pulmonary function


Acute myocardial infarction


Congestive heart failure (< 1 month)


History of inflammatory bowel disease


Medical patient at bed rest Age 61-74


Arthroscopic surgery


Major open surgery (> 45 min)


Laparoscopic surgery (> 45 min)


Malignancy


Confined to bed (> 72 hours)


Immobilizing plaster cast


Central venous access Age >= 75


History of VTE


Family history of VTE


Factor V Leiden


Prothrombin 54514G


Lupus anticoagulant


Anticardiolipin antibodies


Elevated serum homocysteine


Heparin-induced thrombocytopenia


Other congenital or acquired


   thrombophilia Stroke (< 1 month)


Elective arthroplasty


Hip, pelvis, or leg fracture


Acute spinal cord injury (< 1 month)








Prophylaxis Regimen











   Total Risk


Factor Score Risk Level Prophylaxis Regimen


 


0-1      Low Early ambulation


 


2 Moderate Order ONE of the following:


*Sequential Compression Device (SCD)


*Heparin 5000 units SQ BID


 


3-4 Higher Order ONE of the following medications:


*Heparin 5000 units SQ TID


*Enoxaparin/Lovenox 40 mg SQ daily (WT < 150 kg, CrCl > 30 mL/min)


*Enoxaparin/Lovenox 30 mg SQ daily (WT < 150 kg, CrCl > 10-29 mL/min)


*Enoxaparin/Lovenox 30 mg SQ BID (WT < 150 kg, CrCl > 30 mL/min)


AND/OR


*Sequential Compression Device (SCD)


 


5 or more Highest Order ONE of the following medications:


*Heparin 5000 units SQ TID (Preferred with Epidurals)


*Enoxaparin/Lovenox 40 mg SQ daily (WT < 150 kg, CrCl > 30 mL/min)


*Enoxaparin/Lovenox 30 mg SQ daily (WT < 150 kg, CrCl > 10-29 mL/min)


*Enoxaparin/Lovenox 30 mg SQ BID (WT < 150 kg, CrCl > 30 mL/min)


AND


*Sequential Compression Device (SCD)











Assessment and Plan


Problem List:  


(1) Lung cancer


ICD Code:  C34.90 - Malignant neoplasm of unspecified part of unspecified 

bronchus or lung


(2) Metabolic encephalopathy


ICD Code:  G93.41 - Metabolic encephalopathy


(3) ARF (acute renal failure)


ICD Code:  N17.9 - Acute kidney failure, unspecified


Assessment and Plan


89-year-old man with





Metabolic versus toxic encephalopathy


   Patient with a history of lung cancer with metastases to the neck, will rule 

out CVA vs brain mets with brain MRI


   Head CT noted and reviewed by me without any intracranial abnormality


   Chest x-ray noted and reviewed by me without any pulmonary infiltrates


   Check ammonia level


   Hold all CNS depressing medications





Acute renal failure


   Prerenal, likely secondary to dehydration


   Gentle IV fluid hydration and avoid all nephrotoxic drug





Soft BP


   Hold all antihypertensive medications for blood pressure<110/60





History of lung cancer


   Currently receiving radiation therapy, therefore will consult radiation 

oncology





Atrial fibrillation


   Resume Coumadin when INR resulted





Other chronic medical conditions


   Resume outpatient medications





Atypical chest pain


   Although patient report resolution of symptoms, request medical records from 

[facility] as needed





DVT prophylaxis: Bilateral SCDs


Code Status


Full code


Discussed Condition With


Patient, ED physician











Alfredo Freed MD 2018 15:27

## 2018-04-19 VITALS
RESPIRATION RATE: 18 BRPM | TEMPERATURE: 97.5 F | HEART RATE: 61 BPM | OXYGEN SATURATION: 97 % | SYSTOLIC BLOOD PRESSURE: 145 MMHG | DIASTOLIC BLOOD PRESSURE: 65 MMHG

## 2018-04-19 VITALS
RESPIRATION RATE: 18 BRPM | TEMPERATURE: 96.3 F | SYSTOLIC BLOOD PRESSURE: 193 MMHG | OXYGEN SATURATION: 99 % | DIASTOLIC BLOOD PRESSURE: 93 MMHG | HEART RATE: 66 BPM

## 2018-04-19 VITALS
RESPIRATION RATE: 18 BRPM | OXYGEN SATURATION: 97 % | TEMPERATURE: 96.7 F | SYSTOLIC BLOOD PRESSURE: 103 MMHG | DIASTOLIC BLOOD PRESSURE: 51 MMHG | HEART RATE: 65 BPM

## 2018-04-19 VITALS
TEMPERATURE: 98.5 F | HEART RATE: 67 BPM | RESPIRATION RATE: 17 BRPM | OXYGEN SATURATION: 96 % | SYSTOLIC BLOOD PRESSURE: 173 MMHG | DIASTOLIC BLOOD PRESSURE: 83 MMHG

## 2018-04-19 VITALS
RESPIRATION RATE: 16 BRPM | OXYGEN SATURATION: 98 % | HEART RATE: 67 BPM | TEMPERATURE: 97.6 F | DIASTOLIC BLOOD PRESSURE: 80 MMHG | SYSTOLIC BLOOD PRESSURE: 172 MMHG

## 2018-04-19 VITALS — OXYGEN SATURATION: 98 %

## 2018-04-19 LAB
ALBUMIN SERPL-MCNC: 3.2 GM/DL (ref 3.4–5)
ALP SERPL-CCNC: 136 U/L (ref 45–117)
ALT SERPL-CCNC: 14 U/L (ref 12–78)
AST SERPL-CCNC: 14 U/L (ref 15–37)
BASOPHILS # BLD AUTO: 0 TH/MM3 (ref 0–0.2)
BASOPHILS NFR BLD: 0.6 % (ref 0–2)
BILIRUB SERPL-MCNC: 0.4 MG/DL (ref 0.2–1)
BUN SERPL-MCNC: 24 MG/DL (ref 7–18)
CALCIUM SERPL-MCNC: 8.8 MG/DL (ref 8.5–10.1)
CHLORIDE SERPL-SCNC: 109 MEQ/L (ref 98–107)
CREAT SERPL-MCNC: 1.14 MG/DL (ref 0.6–1.3)
EOSINOPHIL # BLD: 0.2 TH/MM3 (ref 0–0.4)
EOSINOPHIL NFR BLD: 3.2 % (ref 0–4)
ERYTHROCYTE [DISTWIDTH] IN BLOOD BY AUTOMATED COUNT: 14.9 % (ref 11.6–17.2)
GFR SERPLBLD BASED ON 1.73 SQ M-ARVRAT: 60 ML/MIN (ref 89–?)
GLUCOSE SERPL-MCNC: 91 MG/DL (ref 74–106)
HCO3 BLD-SCNC: 24 MEQ/L (ref 21–32)
HCT VFR BLD CALC: 34.5 % (ref 39–51)
HGB BLD-MCNC: 11.8 GM/DL (ref 13–17)
INR PPP: 2.2 RATIO
LYMPHOCYTES # BLD AUTO: 0.6 TH/MM3 (ref 1–4.8)
LYMPHOCYTES NFR BLD AUTO: 12.5 % (ref 9–44)
MCH RBC QN AUTO: 29 PG (ref 27–34)
MCHC RBC AUTO-ENTMCNC: 34.1 % (ref 32–36)
MCV RBC AUTO: 85.1 FL (ref 80–100)
MONOCYTE #: 0.5 TH/MM3 (ref 0–0.9)
MONOCYTES NFR BLD: 9.5 % (ref 0–8)
NEUTROPHILS # BLD AUTO: 3.7 TH/MM3 (ref 1.8–7.7)
NEUTROPHILS NFR BLD AUTO: 74.2 % (ref 16–70)
PLATELET # BLD: 245 TH/MM3 (ref 150–450)
PMV BLD AUTO: 7.6 FL (ref 7–11)
PROT SERPL-MCNC: 7.2 GM/DL (ref 6.4–8.2)
PROTHROMBIN TIME: 22.6 SEC (ref 9.8–11.6)
RBC # BLD AUTO: 4.06 MIL/MM3 (ref 4.5–5.9)
SODIUM SERPL-SCNC: 142 MEQ/L (ref 136–145)
TROPONIN I SERPL-MCNC: (no result) NG/ML (ref 0.02–0.05)
WBC # BLD AUTO: 5 TH/MM3 (ref 4–11)

## 2018-04-19 RX ADMIN — PHENYTOIN SODIUM SCH MLS/HR: 50 INJECTION INTRAMUSCULAR; INTRAVENOUS at 15:52

## 2018-04-19 RX ADMIN — WARFARIN SODIUM SCH MG: 5 TABLET ORAL at 15:51

## 2018-04-19 RX ADMIN — AMIODARONE HYDROCHLORIDE SCH MG: 200 TABLET ORAL at 09:37

## 2018-04-19 RX ADMIN — Medication SCH ML: at 09:37

## 2018-04-19 RX ADMIN — ISOSORBIDE MONONITRATE SCH MG: 30 TABLET, EXTENDED RELEASE ORAL at 06:11

## 2018-04-19 RX ADMIN — ATORVASTATIN CALCIUM SCH MG: 20 TABLET, FILM COATED ORAL at 23:41

## 2018-04-19 RX ADMIN — FAMOTIDINE SCH MG: 20 TABLET, FILM COATED ORAL at 23:41

## 2018-04-19 RX ADMIN — PHENYTOIN SODIUM SCH MLS/HR: 50 INJECTION INTRAMUSCULAR; INTRAVENOUS at 03:55

## 2018-04-19 RX ADMIN — LEVOTHYROXINE SODIUM SCH MCG: 75 TABLET ORAL at 06:11

## 2018-04-19 RX ADMIN — Medication SCH ML: at 21:00

## 2018-04-19 RX ADMIN — CLOPIDOGREL BISULFATE SCH MG: 75 TABLET, FILM COATED ORAL at 09:37

## 2018-04-19 NOTE — HHI.PR
Subjective


Remarks


in no acute distress.


has on and off chest pain/tenderness.


feels weak.


d/w the PT at the bedside.





Objective


Vitals





Vital Signs








  Date Time  Temp Pulse Resp B/P (MAP) Pulse Ox O2 Delivery O2 Flow Rate FiO2


 


4/19/18 08:14 97.5 61 18 145/65 (91) 97   


 


4/19/18 04:15 98.5 67 17 173/83 (113) 96   


 


4/18/18 23:56 98.2 68 17 199/88 (125) 96   


 


4/18/18 21:31 98.4 65 17 162/79 (106) 96   


 


4/18/18 17:23  63 17 131/68 (89) 98   


 


4/18/18 16:10  60 16 123/61 (81) 97 Room Air  


 


4/18/18 14:12  62 19 107/61 (76) 98 Room Air  


 


4/18/18 12:33     98 Room Air  


 


4/18/18 12:14 97.5 63 19 100/56 (71) 98   














I/O      


 


 4/18/18 4/18/18 4/18/18 4/19/18 4/19/18 4/19/18





 07:00 15:00 23:00 07:00 15:00 23:00


 


Intake Total  1000 ml    


 


Balance  1000 ml    


 


      


 


Intake IV Total  1000 ml    


 


# Voids   2   








Result Diagram:  


4/19/18 0710                                                                   

             4/19/18 0710





Imaging





Last Impressions








Chest CT 4/18/18 1710 Signed





Impressions: 





 Service Date/Time:  Wednesday, April 18, 2018 17:49 - CONCLUSION:  Bilateral 





 pulmonary nodules. Left hilar lymphadenopathy.     Sixto Arreola MD 


 


Head CT 4/18/18 1228 Signed





Impressions: 





 Service Date/Time:  Wednesday, April 18, 2018 14:26 - CONCLUSION:  No acute 





 intracranial findings     Sixto Arreola MD 


 


Chest X-Ray 4/18/18 1228 Signed





Impressions: 





 Service Date/Time:  Wednesday, April 18, 2018 12:47 - CONCLUSION: No acute 





 disease.       Stefano Christensen MD  FACR


 


Brain MRI 4/18/18 0000 Signed





Impressions: 





 Service Date/Time:  Wednesday, April 18, 2018 21:43 - CONCLUSION:  No acute 





 intracranial findings. Cystic left parotid mass     Sixto Arreola MD 








Objective Remarks


GENERAL: This is a well-nourished, well-developed patient, in no apparent 

distress.


CARDIOVASCULAR: Regular rate and regular rhythm without murmurs, gallops, or 

rubs. 


RESPIRATORY: Clear to auscultation. Breath sounds equal bilaterally. No wheezes

, rales, or rhonchi.  


  chest is tender to touch.


GASTROINTESTINAL: Abdomen soft, non-tender, nondistended. 


Normal, active bowel sounds


MUSCULOSKELETAL: Extremities without clubbing, cyanosis, or edema.


NEURO:  Alert & Oriented x4 to person, place, time, situation.  Moves all ext x4


Medications and IVs





Inpatient Medications


Acetaminophen (Tylenol) 650 mg Q6H  PRN PO PAIN SCALE 1 TO 2;  Start 4/18/18 at 

15:30


Albuterol/ Ipratropium (Duoneb Neb) 1 ampule Q2HR NEB  PRN NEB SOB/WHEEZING;  

Start 4/18/18 at 15:30


Amiodarone HCl (Cordarone) 200 mg DAILY PO ;  Start 4/19/18 at 09:00


Atorvastatin Calcium (Lipitor) 20 mg HS PO  Last administered on 4/18/18at 20:57

;  Start 4/18/18 at 21:00


Clopidogrel Bisulfate (Plavix) 75 mg DAILY PO ;  Start 4/19/18 at 09:00


Isosorbide Mononitrate (Imdur) 30 mg DAILY@07 PO  Last administered on 4/19/ 18at 06:11;  Start 4/19/18 at 07:00


Levothyroxine Sodium (Synthroid) 75 mcg DAILY@0600 PO  Last administered on 4/19 /18at 06:11;  Start 4/19/18 at 06:00


Magnesium Hydroxide (Milk Of Magnesia Liq) 30 ml Q12H  PRN PO Mild constipation

;  Start 4/18/18 at 15:30


Naloxone HCl (Narcan Inj) 0.4 mg UNSCH  PRN IV PUSH SEE LABEL COMMENTS;  Start 4 /18/18 at 15:30


Ondansetron HCl (Zofran Inj) 4 mg Q6H  PRN IVP NAUSEA OR VOMITING;  Start 4/18/ 18 at 15:30


Pharmacy Profile Note 0 ml @ 0 mls/hr UNSCH OTHER ;  Start 4/18/18 at 20:45


Sodium Chloride 1,000 ml @  84 mls/hr [id number] IV  Last administered on 4/18/ 18at 16:43;  Start 4/18/18 at 16:00


Sodium Chloride (NS Flush) 2 ml BID IV FLUSH ;  Start 4/18/18 at 21:00


Warfarin Sodium (Coumadin) 5 mg DAILY@1600 PO ;  Start 4/19/18 at 16:00





A/P


Problem List:  


(1) Lung cancer


ICD Code:  C34.90 - Malignant neoplasm of unspecified part of unspecified 

bronchus or lung


(2) Metabolic encephalopathy


ICD Code:  G93.41 - Metabolic encephalopathy


(3) ARF (acute renal failure)


ICD Code:  N17.9 - Acute kidney failure, unspecified


Assessment and Plan


Metabolic versus toxic encephalopathy


   Patient with a history of lung cancer with metastases to the neck.


   Head CT/MRI brain noted and  without any intracranial abnormality


   Chest x-ray notedwithout any pulmonary infiltrates


   Hold all CNS depressing medications





Acute renal failure- improved.


   Prerenal, likely secondary to dehydration


   Gentle IV fluid hydration and avoid all nephrotoxic drug





Soft BP


   Hold all antihypertensive medications for blood pressure<110/60





History of lung cancer


   Currently receiving radiation therapy-radiation oncology consult appreciated.





Atrial fibrillation


   Resumed Coumadin.





Other chronic medical conditions


   Resumed outpatient medications





Atypical chest pain


   Although patient report resolution of symptoms, request medical records from 

[facility] .





DVT prophylaxis: Bilateral SCDs


Discharge Planning


awaiting PT evaluation.











Marty Garcia MD Apr 19, 2018 09:37

## 2018-04-19 NOTE — EKG
Date Performed: 04/18/2018       Time Performed: 12:30:26

 

PTAGE:      89 years

 

EKG:      Sinus rhythm 

 

 RIGHT BUNDLE BRANCH BLOCK ST DEPRESSION, CONSIDER SUBENDOCARDIAL INJURY Since previous tracing, no s
ignificant change noted ABNORMAL ECG

 

PREVIOUS TRACING       : 03/27/2018 13.24

 

DOCTOR:   Bebeto Mann  Interpretating Date/Time  04/19/2018 15:46:20

## 2018-04-20 VITALS
SYSTOLIC BLOOD PRESSURE: 203 MMHG | OXYGEN SATURATION: 99 % | DIASTOLIC BLOOD PRESSURE: 90 MMHG | HEART RATE: 73 BPM | TEMPERATURE: 98 F | RESPIRATION RATE: 16 BRPM

## 2018-04-20 VITALS
TEMPERATURE: 97.5 F | RESPIRATION RATE: 17 BRPM | HEART RATE: 48 BPM | SYSTOLIC BLOOD PRESSURE: 134 MMHG | DIASTOLIC BLOOD PRESSURE: 74 MMHG | OXYGEN SATURATION: 98 %

## 2018-04-20 VITALS
RESPIRATION RATE: 18 BRPM | DIASTOLIC BLOOD PRESSURE: 78 MMHG | SYSTOLIC BLOOD PRESSURE: 176 MMHG | HEART RATE: 64 BPM | TEMPERATURE: 98.2 F | OXYGEN SATURATION: 97 %

## 2018-04-20 VITALS
TEMPERATURE: 97.9 F | DIASTOLIC BLOOD PRESSURE: 60 MMHG | HEART RATE: 68 BPM | SYSTOLIC BLOOD PRESSURE: 128 MMHG | OXYGEN SATURATION: 98 %

## 2018-04-20 VITALS
SYSTOLIC BLOOD PRESSURE: 186 MMHG | HEART RATE: 60 BPM | TEMPERATURE: 98.1 F | DIASTOLIC BLOOD PRESSURE: 83 MMHG | RESPIRATION RATE: 18 BRPM | OXYGEN SATURATION: 97 %

## 2018-04-20 VITALS
RESPIRATION RATE: 20 BRPM | HEART RATE: 70 BPM | DIASTOLIC BLOOD PRESSURE: 62 MMHG | OXYGEN SATURATION: 95 % | TEMPERATURE: 97.9 F | SYSTOLIC BLOOD PRESSURE: 138 MMHG

## 2018-04-20 VITALS
TEMPERATURE: 98.2 F | SYSTOLIC BLOOD PRESSURE: 120 MMHG | OXYGEN SATURATION: 96 % | RESPIRATION RATE: 20 BRPM | HEART RATE: 70 BPM | DIASTOLIC BLOOD PRESSURE: 48 MMHG

## 2018-04-20 LAB
INR PPP: 2.1 RATIO
PROTHROMBIN TIME: 21.2 SEC (ref 9.8–11.6)

## 2018-04-20 RX ADMIN — PHENYTOIN SODIUM SCH MLS/HR: 50 INJECTION INTRAMUSCULAR; INTRAVENOUS at 10:14

## 2018-04-20 RX ADMIN — ATORVASTATIN CALCIUM SCH MG: 20 TABLET, FILM COATED ORAL at 21:41

## 2018-04-20 RX ADMIN — PHENYTOIN SODIUM SCH MLS/HR: 50 INJECTION INTRAMUSCULAR; INTRAVENOUS at 15:40

## 2018-04-20 RX ADMIN — Medication SCH ML: at 21:00

## 2018-04-20 RX ADMIN — ISOSORBIDE MONONITRATE SCH MG: 30 TABLET, EXTENDED RELEASE ORAL at 10:16

## 2018-04-20 RX ADMIN — CLOPIDOGREL BISULFATE SCH MG: 75 TABLET, FILM COATED ORAL at 10:14

## 2018-04-20 RX ADMIN — FAMOTIDINE SCH MG: 20 TABLET, FILM COATED ORAL at 21:41

## 2018-04-20 RX ADMIN — LEVOTHYROXINE SODIUM SCH MCG: 75 TABLET ORAL at 10:15

## 2018-04-20 RX ADMIN — FAMOTIDINE SCH MG: 20 TABLET, FILM COATED ORAL at 10:14

## 2018-04-20 RX ADMIN — WARFARIN SODIUM SCH MG: 5 TABLET ORAL at 16:46

## 2018-04-20 RX ADMIN — AMIODARONE HYDROCHLORIDE SCH MG: 200 TABLET ORAL at 10:15

## 2018-04-20 RX ADMIN — PHENYTOIN SODIUM SCH MLS/HR: 50 INJECTION INTRAMUSCULAR; INTRAVENOUS at 21:42

## 2018-04-20 RX ADMIN — Medication SCH ML: at 10:17

## 2018-04-20 NOTE — HHI.PR
Subjective


Remarks


in no acute distress.


denies chest pain.


has mild neck pain.





Objective


Vitals





Vital Signs








  Date Time  Temp Pulse Resp B/P (MAP) Pulse Ox O2 Delivery O2 Flow Rate FiO2


 


4/20/18 07:45 97.9 70 20 138/62 (87) 95   


 


4/20/18 04:31 97.5 48 17 134/74 (94) 98   


 


4/20/18 01:25 98.0 73 16 203/90 (127) 99   


 


4/19/18 21:27     98 Nasal Cannula  


 


4/19/18 20:11 97.6 67 16 172/80 (110) 98   


 


4/19/18 15:56 96.3 66 18 193/93 (126) 99   


 


4/19/18 12:07 96.7 65 18 103/51 (68) 97   














I/O      


 


 4/19/18 4/19/18 4/19/18 4/20/18 4/20/18 4/20/18





 07:00 15:00 23:00 07:00 15:00 23:00


 


      


 


# Voids   4   








Result Diagram:  


4/19/18 0710                                                                   

             4/19/18 0710





Imaging





Last Impressions








Chest CT 4/18/18 1710 Signed





Impressions: 





 Service Date/Time:  Wednesday, April 18, 2018 17:49 - CONCLUSION:  Bilateral 





 pulmonary nodules. Left hilar lymphadenopathy.     Sixto Arreola MD 


 


Head CT 4/18/18 1228 Signed





Impressions: 





 Service Date/Time:  Wednesday, April 18, 2018 14:26 - CONCLUSION:  No acute 





 intracranial findings     Sixto Arreola MD 


 


Chest X-Ray 4/18/18 1228 Signed





Impressions: 





 Service Date/Time:  Wednesday, April 18, 2018 12:47 - CONCLUSION: No acute 





 disease.       Stefano Christensen MD  FACR


 


Brain MRI 4/18/18 0000 Signed





Impressions: 





 Service Date/Time:  Wednesday, April 18, 2018 21:43 - CONCLUSION:  No acute 





 intracranial findings. Cystic left parotid mass     Sixto Arreola MD 








Objective Remarks


GENERAL: This is a well-nourished, well-developed patient, in no apparent 

distress.


CARDIOVASCULAR: Regular rate and regular rhythm without murmurs, gallops, or 

rubs. 


RESPIRATORY: Clear to auscultation. Breath sounds equal bilaterally. No wheezes

, rales, or rhonchi.  


  chest is tender to touch.


GASTROINTESTINAL: Abdomen soft, non-tender, nondistended. 


Normal, active bowel sounds


MUSCULOSKELETAL: Extremities without clubbing, cyanosis, or edema.


NEURO:  Alert & Oriented x4 to person, place, time, situation.  Moves all ext x4


Medications and IVs





Inpatient Medications


Acetaminophen (Tylenol) 650 mg Q6H  PRN PO PAIN SCALE 1 TO 2;  Start 4/18/18 at 

15:30


Al Hydrox/Mg Hydrox/Simethicone (Mag-Al Plus Susp Liq) 30 ml ONCE  ONCE PO  

Last administered on 4/19/18at 17:39;  Start 4/19/18 at 16:45;  Stop 4/19/18 at 

16:46;  Status DC


Albuterol/ Ipratropium (Duoneb Neb) 1 ampule Q2HR NEB  PRN NEB SOB/WHEEZING;  

Start 4/18/18 at 15:30


Amiodarone HCl (Cordarone) 200 mg DAILY PO  Last administered on 4/19/18at 09:37

;  Start 4/19/18 at 09:00


Atorvastatin Calcium (Lipitor) 20 mg HS PO  Last administered on 4/19/18at 23:41

;  Start 4/18/18 at 21:00


Calcium Carbonate (Tums Chew) 500 mg Q6H  PRN CHEW DYSPEPSIA OR HEARTBURN;  

Start 4/19/18 at 15:45


Clonidine (Catapres) 0.1 mg ONCE  ONCE PO  Last administered on 4/20/18at 01:45

;  Start 4/20/18 at 01:45;  Stop 4/20/18 at 01:50;  Status DC


Clopidogrel Bisulfate (Plavix) 75 mg DAILY PO  Last administered on 4/19/18at 09

:37;  Start 4/19/18 at 09:00


Famotidine (Pepcid) 20 mg BID PO  Last administered on 4/19/18at 23:41;  Start 4 /19/18 at 21:00


Isosorbide Mononitrate (Imdur) 30 mg DAILY@07 PO  Last administered on 4/19/ 18at 06:11;  Start 4/19/18 at 07:00


Levothyroxine Sodium (Synthroid) 75 mcg DAILY@0600 PO  Last administered on 4/19 /18at 06:11;  Start 4/19/18 at 06:00


Magnesium Hydroxide (Milk Of Magnesia Liq) 30 ml Q12H  PRN PO Mild constipation

;  Start 4/18/18 at 15:30


Naloxone HCl (Narcan Inj) 0.4 mg UNSCH  PRN IV PUSH SEE LABEL COMMENTS;  Start 4 /18/18 at 15:30


Ondansetron HCl (Zofran Inj) 4 mg Q6H  PRN IVP NAUSEA OR VOMITING Last 

administered on 4/19/18at 17:39;  Start 4/18/18 at 15:30


Pharmacy Profile Note 0 ml @ 0 mls/hr UNSCH OTHER ;  Start 4/18/18 at 20:45


Sodium Chloride 1,000 ml @  84 mls/hr [id number] IV  Last administered on 4/18/ 18at 16:43;  Start 4/18/18 at 16:00


Sodium Chloride (NS Flush) 2 ml BID IV FLUSH  Last administered on 4/19/18at 09:

37;  Start 4/18/18 at 21:00


Warfarin Sodium (Coumadin) 5 mg DAILY@1600 PO  Last administered on 4/19/18at 15

:51;  Start 4/19/18 at 16:00





A/P


Problem List:  


(1) Lung cancer


ICD Code:  C34.90 - Malignant neoplasm of unspecified part of unspecified 

bronchus or lung


(2) Metabolic encephalopathy


ICD Code:  G93.41 - Metabolic encephalopathy


(3) ARF (acute renal failure)


ICD Code:  N17.9 - Acute kidney failure, unspecified


Assessment and Plan


Metabolic versus toxic encephalopathy


   Patient with a history of lung cancer with metastases to the neck.


   Head CT/MRI brain noted and  without any intracranial abnormality


   Chest x-ray noted without any pulmonary infiltrates


   Hold all CNS depressing medications





Acute renal failure- improved.


   Prerenal, likely secondary to dehydration


   Gentle IV fluid hydration and avoid all nephrotoxic drug





Soft BP


   Hold all antihypertensive medications for blood pressure<110/60





History of lung cancer


   Currently receiving radiation therapy-radiation oncology consult appreciated.





Atrial fibrillation


   Resumed Coumadin.





Other chronic medical conditions


   Resumed outpatient medications





Atypical chest pain- now has resolved.


   [facility] notes reviewed; had cardiac cath this month and was 

evaluated by cardiology ; recommended the antianginal medications to be 

intensified.





DVT prophylaxis: Bilateral SCDs


Discharge Planning


PT recommendations noted; will consult case management for SNF.











Marty Garcia MD Apr 20, 2018 08:56

## 2018-04-20 NOTE — HHI.DCPOC
Discharge Care Plan


Diagnosis:  


(1) Chest pain


(2) Dehydration


(3) DC (acute kidney injury)


(4) HTN (hypertension)


(5) Acid reflux


Goals to Promote Your Health


* To prevent worsening of your condition and complications


* To maintain your health at the optimal level


Directions to Meet Your Goals


*** Take your medications as prescribed


*** Follow your dietary instruction


*** Follow activity as directed








*** Keep your appointments as scheduled


*** Take your immunizations and boosters as scheduled


*** If your symptoms worsen call your PCP, if no PCP go to Urgent Care Center 

or Emergency Room***


*** Smoking is Dangerous to Your Health. Avoid second hand smoke***


***Call the 24-hour hour crisis hotline for domestic abuse at 1-115.260.4677***











Isis Quiñones PA-C Apr 20, 2018 8:59 am

## 2018-04-21 VITALS
DIASTOLIC BLOOD PRESSURE: 66 MMHG | SYSTOLIC BLOOD PRESSURE: 138 MMHG | OXYGEN SATURATION: 95 % | HEART RATE: 63 BPM | RESPIRATION RATE: 18 BRPM | TEMPERATURE: 98 F

## 2018-04-21 VITALS — DIASTOLIC BLOOD PRESSURE: 67 MMHG | SYSTOLIC BLOOD PRESSURE: 137 MMHG

## 2018-04-21 VITALS
SYSTOLIC BLOOD PRESSURE: 134 MMHG | RESPIRATION RATE: 16 BRPM | TEMPERATURE: 98.3 F | DIASTOLIC BLOOD PRESSURE: 64 MMHG | OXYGEN SATURATION: 96 % | HEART RATE: 80 BPM

## 2018-04-21 VITALS
SYSTOLIC BLOOD PRESSURE: 124 MMHG | RESPIRATION RATE: 18 BRPM | HEART RATE: 63 BPM | DIASTOLIC BLOOD PRESSURE: 64 MMHG | OXYGEN SATURATION: 96 % | TEMPERATURE: 98 F

## 2018-04-21 VITALS
HEART RATE: 60 BPM | TEMPERATURE: 98.2 F | OXYGEN SATURATION: 95 % | SYSTOLIC BLOOD PRESSURE: 164 MMHG | RESPIRATION RATE: 17 BRPM | DIASTOLIC BLOOD PRESSURE: 82 MMHG

## 2018-04-21 VITALS
TEMPERATURE: 98.2 F | DIASTOLIC BLOOD PRESSURE: 77 MMHG | SYSTOLIC BLOOD PRESSURE: 168 MMHG | RESPIRATION RATE: 18 BRPM | HEART RATE: 68 BPM | OXYGEN SATURATION: 96 %

## 2018-04-21 VITALS — SYSTOLIC BLOOD PRESSURE: 156 MMHG | DIASTOLIC BLOOD PRESSURE: 62 MMHG

## 2018-04-21 LAB
INR PPP: 2.4 RATIO
PROTHROMBIN TIME: 24.2 SEC (ref 9.8–11.6)

## 2018-04-21 RX ADMIN — VALSARTAN SCH MG: 160 TABLET ORAL at 11:09

## 2018-04-21 RX ADMIN — AMIODARONE HYDROCHLORIDE SCH MG: 200 TABLET ORAL at 11:09

## 2018-04-21 RX ADMIN — Medication SCH ML: at 09:00

## 2018-04-21 RX ADMIN — WARFARIN SODIUM SCH MG: 5 TABLET ORAL at 18:55

## 2018-04-21 RX ADMIN — CLOPIDOGREL BISULFATE SCH MG: 75 TABLET, FILM COATED ORAL at 11:08

## 2018-04-21 RX ADMIN — ATORVASTATIN CALCIUM SCH MG: 20 TABLET, FILM COATED ORAL at 22:48

## 2018-04-21 RX ADMIN — LEVOTHYROXINE SODIUM SCH MCG: 75 TABLET ORAL at 07:06

## 2018-04-21 RX ADMIN — FAMOTIDINE SCH MG: 20 TABLET, FILM COATED ORAL at 11:09

## 2018-04-21 RX ADMIN — ISOSORBIDE MONONITRATE SCH MG: 30 TABLET, EXTENDED RELEASE ORAL at 07:06

## 2018-04-21 RX ADMIN — Medication SCH ML: at 22:49

## 2018-04-21 RX ADMIN — FAMOTIDINE SCH MG: 20 TABLET, FILM COATED ORAL at 22:48

## 2018-04-21 NOTE — EKG
Date Performed: 04/19/2018       Time Performed: 12:56:19

 

PTAGE:      89 years

 

EKG:      Sinus rhythm 

 

 POSSIBLE RIGHT VENTRICULAR CONDUCTION DELAY MODERATE ST DEPRESSION PROLONGED QT INTERVAL ABNORMAL EC
G

 

PREVIOUS TRACING       : 04/18/2018 12.30

 

DOCTOR:   Alma Johnson  Interpretating Date/Time  04/21/2018 09:17:04

## 2018-04-21 NOTE — HHI.FF
Face to Face Verification


Diagnosis:  


(1) Metabolic encephalopathy


(2) DC (acute kidney injury)


(3) Lung cancer


(4) HTN (hypertension)


Physical Therapy


Order:  Evaluate and Treat, Improve ambulation, Strength and gait training





Occupational Therapy


Order:  Evaluate and Treat, Improve ADL





Home Health Nursing








Order: Medical education





 Signs/symptoms of disease process





 Nursing assessment with vital signs














Order: To Evaluate:  Support services


Order: To Provide:  Long range planning, Community services











I have seen patient Asuncion Vanegas on 4/21/18. My clinical findings support 

the need for the requested home health care services because:








 Ltd mobility - disease progression





 Deconditioned w/ increased weakness





 Med compliance is questionable





 Limited ability to care for self














I certify that my clinical findings support that this patient is homebound 

because:








 Unsteady gait/balance





 Unsafe to leave home unassisted





 Unable to use public transportation

















Isis Quiñones PA-C Apr 21, 2018 12:01

## 2018-04-21 NOTE — HHI.PR
Subjective


Remarks


Follow up for encephalopathy, DC, afib, atypical chest pain. The patient 

reports feeling better again today. He denies any headache, lightheadedness, 

dizziness, chest pain, shortness of breath, or abdominal complaints. He is 

tolerating oral intake. He is agreeable to go to rehab, awaiting placement.





Objective


Vitals





Vital Signs








  Date Time  Temp Pulse Resp B/P (MAP) Pulse Ox O2 Delivery O2 Flow Rate FiO2


 


4/21/18 08:06 98.0 63 18 124/64 (84) 96   


 


4/21/18 04:07 98.2 60 17  95   





    164/82 (109)    


 


4/21/18 00:29    156/62 (93)    





    Automatic Cuff    


 


4/20/18 23:56 98.1 60 18 186/83 (117) 97   


 


4/20/18 20:55 98.2 64 18 176/78 (110) 97   


 


4/20/18 16:52 98.2 70 20 120/48 (72) 96   


 


4/20/18 12:36 97.9 68  128/60 (82) 98   


 


4/20/18 10:31      Nasal Cannula 2.00 














I/O      


 


 4/20/18 4/20/18 4/20/18 4/21/18 4/21/18 4/21/18





 07:00 15:00 23:00 07:00 15:00 23:00


 


Intake Total  500 ml    


 


Balance  500 ml    


 


      


 


Intake Oral  500 ml    








Result Diagram:  


4/19/18 0710                                                                   

             4/19/18 0710





Imaging





Last Impressions








Chest CT 4/18/18 1710 Signed





Impressions: 





 Service Date/Time:  Wednesday, April 18, 2018 17:49 - CONCLUSION:  Bilateral 





 pulmonary nodules. Left hilar lymphadenopathy.     Sixto Arreola MD 


 


Head CT 4/18/18 1228 Signed





Impressions: 





 Service Date/Time:  Wednesday, April 18, 2018 14:26 - CONCLUSION:  No acute 





 intracranial findings     Sixto Arreola MD 


 


Chest X-Ray 4/18/18 1228 Signed





Impressions: 





 Service Date/Time:  Wednesday, April 18, 2018 12:47 - CONCLUSION: No acute 





 disease.       Stefano Christensen MD  FACR


 


Brain MRI 4/18/18 0000 Signed





Impressions: 





 Service Date/Time:  Wednesday, April 18, 2018 21:43 - CONCLUSION:  No acute 





 intracranial findings. Cystic left parotid mass     Sixto Arreola MD 








Objective Remarks


GENERAL: Well-developed, well-nourished pleasant elderly male patient in NAD. 


SKIN: Warm and dry.


HEENT: Atraumatic. Normocephalic. Mucous membranes pink and moist.


NECK: Trachea midline.


CARDIOVASCULAR: Regular rate and rhythm. No murmur appreciated. 


RESPIRATORY: No accessory muscle use. Clear to auscultation. Breath sounds 

equal bilaterally. 


GASTROINTESTINAL: Abdomen soft, non-tender, nondistended. 


MUSCULOSKELETAL: Extremities without clubbing, cyanosis, or edema. No obvious 

deformities. 


NEUROLOGICAL: Awake and alert. No obvious cranial nerve deficits.  Motor 

grossly within normal limits.  Normal speech.


PSYCHIATRIC: Appropriate mood and affect; insight and judgment normal.


Medications and IVs





Current Medications








 Medications


  (Trade)  Dose


 Ordered  Sig/Ha


 Route  Start Time


 Stop Time Status Last Admin


 


  (NS Flush)  2 ml  UNSCH  PRN


 IV FLUSH  4/18/18 15:30


     


 


 


  (NS Flush)  2 ml  BID


 IV FLUSH  4/18/18 21:00


    4/20/18 10:17


 


 


  (Tylenol)  650 mg  Q4H  PRN


 PO  4/18/18 15:30


     


 


 


  (Zofran Inj)  4 mg  Q6H  PRN


 IVP  4/18/18 15:30


    4/19/18 17:39


 


 


  (Tylenol)  650 mg  Q6H  PRN


 PO  4/18/18 15:30


     


 


 


  (Narcan Inj)  0.4 mg  UNSCH  PRN


 IV PUSH  4/18/18 15:30


     


 


 


  (Milk Of


 Magnesia Liq)  30 ml  Q12H  PRN


 PO  4/18/18 15:30


     


 


 


  (Duoneb Neb)  1 ampule  Q2HR NEB  PRN


 NEB  4/18/18 15:30


     


 


 


  (Cordarone)  200 mg  DAILY


 PO  4/19/18 09:00


    4/20/18 10:15


 


 


  (Lipitor)  20 mg  HS


 PO  4/18/18 21:00


    4/20/18 21:41


 


 


  (Plavix)  75 mg  DAILY


 PO  4/19/18 09:00


    4/20/18 10:14


 


 


  (Imdur)  30 mg  DAILY@07


 PO  4/19/18 07:00


    4/21/18 07:06


 


 


  (Synthroid)  75 mcg  DAILY@0600


 PO  4/19/18 06:00


    4/21/18 07:06


 


 


  (Coumadin)  5 mg  DAILY@1600


 PO  4/19/18 16:00


    4/20/18 16:46


 


 


 Pharmacy Profile


 Note  0 ml @ 0


 mls/hr  UNSCH


 OTHER  4/18/18 20:45


     


 


 


  (Pepcid)  20 mg  BID


 PO  4/19/18 21:00


    4/20/18 21:41


 


 


  (Tums Chew)  500 mg  Q6H  PRN


 CHEW  4/19/18 15:45


     


 


 


  (Norvasc)  5 mg  DAILY


 PO  4/20/18 10:00


    4/20/18 10:18


 


 


  (Diovan)  160 mg  DAILY


 PO  4/21/18 09:00


     


 











A/P


Problem List:  


(1) Lung cancer


ICD Code:  C34.90 - Malignant neoplasm of unspecified part of unspecified 

bronchus or lung


(2) Metabolic encephalopathy


ICD Code:  G93.41 - Metabolic encephalopathy


(3) ARF (acute renal failure)


ICD Code:  N17.9 - Acute kidney failure, unspecified


Assessment and Plan


89-year-old male with history of lung cancer, afib, arthritis, HTN, port 

placement, presents with confusion and dizziness. 





Metabolic versus toxic encephalopathy: suspect secondary to dehydration. 

Patient with a history of lung cancer with metastases to the neck.


   -Head CT/MRI brain reviewed, no acute intracranial abnormality


   -Chest x-ray noted without any pulmonary infiltrates


   -Hold all CNS depressing medications


   -Mentation improved


   -PT consulted, recommending rehab





Acute renal failure- improved.


   -Prerenal, likely secondary to dehydration


   -Given gentle IV fluid hydration and avoid all nephrotoxic drug


   -Encourage oral intake





Soft BP: BP initially low upon arrival likely secondary to dehydration


   -Hold all antihypertensive medications for blood pressure<110/60


   -BP started to increase, restarted patient's home medications slowly 

including norvasc and diovan





History of lung cancer


   -Currently receiving radiation therapy-radiation oncology consult 

appreciated.





Atrial fibrillation: chronic, rate controlled


   -Resumed patient's Amiodarone, Coumadin.


   -Monitor INR





Other chronic medical conditions


   -Resumed outpatient medications





Atypical chest pain- now has resolved.


   -Troponin negative and EKG without acute ischemic changes


   -[facility] notes reviewed; had cardiac cath this month and was 

evaluated by cardiology ; recommended the antianginal medications to be 

intensified.


   -Chest pain currently resolved





DVT prophylaxis: Bilateral SCDs


Discharge Planning


Discharge to SNF when arrangements made by case management.





Attending Statement


patient was seen and examined today.


looks fairly comfortable.


denies any new complaints.


awaiting discharge to rehab.











Isis Quiñones PA-C Apr 21, 2018 08:15


Marty Garcia MD Apr 21, 2018 10:56

## 2018-04-22 VITALS
DIASTOLIC BLOOD PRESSURE: 83 MMHG | TEMPERATURE: 98.1 F | SYSTOLIC BLOOD PRESSURE: 160 MMHG | HEART RATE: 69 BPM | RESPIRATION RATE: 18 BRPM | OXYGEN SATURATION: 96 %

## 2018-04-22 VITALS
DIASTOLIC BLOOD PRESSURE: 86 MMHG | RESPIRATION RATE: 17 BRPM | OXYGEN SATURATION: 97 % | HEART RATE: 67 BPM | TEMPERATURE: 97.3 F | SYSTOLIC BLOOD PRESSURE: 177 MMHG

## 2018-04-22 VITALS — SYSTOLIC BLOOD PRESSURE: 175 MMHG | HEART RATE: 69 BPM | DIASTOLIC BLOOD PRESSURE: 82 MMHG

## 2018-04-22 VITALS
TEMPERATURE: 97.8 F | OXYGEN SATURATION: 96 % | RESPIRATION RATE: 18 BRPM | HEART RATE: 68 BPM | DIASTOLIC BLOOD PRESSURE: 68 MMHG | SYSTOLIC BLOOD PRESSURE: 107 MMHG

## 2018-04-22 VITALS
RESPIRATION RATE: 16 BRPM | SYSTOLIC BLOOD PRESSURE: 159 MMHG | DIASTOLIC BLOOD PRESSURE: 72 MMHG | OXYGEN SATURATION: 95 % | HEART RATE: 69 BPM | TEMPERATURE: 98 F

## 2018-04-22 VITALS
DIASTOLIC BLOOD PRESSURE: 86 MMHG | RESPIRATION RATE: 16 BRPM | OXYGEN SATURATION: 96 % | TEMPERATURE: 97.3 F | HEART RATE: 73 BPM | SYSTOLIC BLOOD PRESSURE: 180 MMHG

## 2018-04-22 VITALS
TEMPERATURE: 98.5 F | RESPIRATION RATE: 18 BRPM | OXYGEN SATURATION: 95 % | SYSTOLIC BLOOD PRESSURE: 100 MMHG | DIASTOLIC BLOOD PRESSURE: 58 MMHG | HEART RATE: 68 BPM

## 2018-04-22 LAB
INR PPP: 2.6 RATIO
PROTHROMBIN TIME: 26.4 SEC (ref 9.8–11.6)

## 2018-04-22 RX ADMIN — FAMOTIDINE SCH MG: 20 TABLET, FILM COATED ORAL at 22:00

## 2018-04-22 RX ADMIN — Medication SCH ML: at 09:00

## 2018-04-22 RX ADMIN — FAMOTIDINE SCH MG: 20 TABLET, FILM COATED ORAL at 10:22

## 2018-04-22 RX ADMIN — AMIODARONE HYDROCHLORIDE SCH MG: 200 TABLET ORAL at 10:23

## 2018-04-22 RX ADMIN — VALSARTAN SCH MG: 160 TABLET ORAL at 10:26

## 2018-04-22 RX ADMIN — ATORVASTATIN CALCIUM SCH MG: 20 TABLET, FILM COATED ORAL at 22:00

## 2018-04-22 RX ADMIN — Medication SCH ML: at 22:00

## 2018-04-22 RX ADMIN — WARFARIN SODIUM SCH MG: 5 TABLET ORAL at 17:37

## 2018-04-22 RX ADMIN — ISOSORBIDE MONONITRATE SCH MG: 30 TABLET, EXTENDED RELEASE ORAL at 06:40

## 2018-04-22 RX ADMIN — LEVOTHYROXINE SODIUM SCH MCG: 75 TABLET ORAL at 06:39

## 2018-04-22 RX ADMIN — CLOPIDOGREL BISULFATE SCH MG: 75 TABLET, FILM COATED ORAL at 10:23

## 2018-04-22 NOTE — HHI.PR
Subjective


Remarks


Follow up for encephalopathy, DC, afib, atypical chest pain. The patient 

reports constant upper anterior chest pain, worse with cough and reproducible 

with palpation. He states this is due to a growth on his bone. He otherwise 

denies any other medical complaints including no headache, lightheadedness, 

dizziness, shortness of breath, or abdominal complaints. He is agreeable to go 

to rehab if this can be arranged.





Objective


Vitals





Vital Signs








  Date Time  Temp Pulse Resp B/P (MAP) Pulse Ox O2 Delivery O2 Flow Rate FiO2


 


4/22/18 07:45 97.8 68 18 107/68 (81) 96   


 


4/22/18 03:36 97.3 67 17 177/86 (116) 97   


 


4/22/18 00:33 97.3 73 16 180/86 (117) 96   


 


4/21/18 20:08 98.3 80 16 134/64 (87) 96   


 


4/21/18 16:06 98.2 68 18 168/77 (107) 96   


 


4/21/18 11:56 98.0 63 18 138/66 (90) 95   


 


4/21/18 11:17    137/67 (90)    














I/O      


 


 4/21/18 4/21/18 4/21/18 4/22/18 4/22/18 4/22/18





 07:00 15:00 23:00 07:00 15:00 23:00


 


Intake Total  600 ml    


 


Balance  600 ml    


 


      


 


Intake Oral  600 ml    


 


# Voids  4    








Result Diagram:  


4/19/18 0710                                                                   

             4/19/18 0710





Imaging





Last Impressions








Chest CT 4/18/18 1710 Signed





Impressions: 





 Service Date/Time:  Wednesday, April 18, 2018 17:49 - CONCLUSION:  Bilateral 





 pulmonary nodules. Left hilar lymphadenopathy.     Sixto Arreola MD 


 


Head CT 4/18/18 1228 Signed





Impressions: 





 Service Date/Time:  Wednesday, April 18, 2018 14:26 - CONCLUSION:  No acute 





 intracranial findings     Sixto Arreola MD 


 


Chest X-Ray 4/18/18 1228 Signed





Impressions: 





 Service Date/Time:  Wednesday, April 18, 2018 12:47 - CONCLUSION: No acute 





 disease.       Stefano Christensen MD  FACR


 


Brain MRI 4/18/18 0000 Signed





Impressions: 





 Service Date/Time:  Wednesday, April 18, 2018 21:43 - CONCLUSION:  No acute 





 intracranial findings. Cystic left parotid mass     Sixto Arreola MD 








Objective Remarks


GENERAL: Well-developed, well-nourished pleasant elderly male patient in [facility]. 


SKIN: Warm and dry.


HEENT: Atraumatic. Normocephalic. Mucous membranes pink and moist.


NECK: Trachea midline.


CARDIOVASCULAR: Regular rate and rhythm. No murmur appreciated. Left upper 

anterior chest at sternoclavicular joint mildly tender to palpation. 


RESPIRATORY: No accessory muscle use. Clear to auscultation. Breath sounds 

equal bilaterally. 


GASTROINTESTINAL: Abdomen soft, non-tender, nondistended. 


MUSCULOSKELETAL: Extremities without clubbing, cyanosis, or edema. No obvious 

deformities. 


NEUROLOGICAL: Awake and alert. No obvious cranial nerve deficits.  Motor 

grossly within normal limits.  Normal speech.


PSYCHIATRIC: Appropriate mood and affect; insight and judgment normal.


Medications and IVs





Current Medications








 Medications


  (Trade)  Dose


 Ordered  Sig/Ha


 Route  Start Time


 Stop Time Status Last Admin


 


  (NS Flush)  2 ml  UNSCH  PRN


 IV FLUSH  4/18/18 15:30


     


 


 


  (NS Flush)  2 ml  BID


 IV FLUSH  4/18/18 21:00


    4/21/18 22:49


 


 


  (Tylenol)  650 mg  Q4H  PRN


 PO  4/18/18 15:30


     


 


 


  (Zofran Inj)  4 mg  Q6H  PRN


 IVP  4/18/18 15:30


    4/19/18 17:39


 


 


  (Tylenol)  650 mg  Q6H  PRN


 PO  4/18/18 15:30


     


 


 


  (Narcan Inj)  0.4 mg  UNSCH  PRN


 IV PUSH  4/18/18 15:30


     


 


 


  (Milk Of


 Magnesia Liq)  30 ml  Q12H  PRN


 PO  4/18/18 15:30


     


 


 


  (Duoneb Neb)  1 ampule  Q2HR NEB  PRN


 NEB  4/18/18 15:30


     


 


 


  (Cordarone)  200 mg  DAILY


 PO  4/19/18 09:00


    4/21/18 11:09


 


 


  (Lipitor)  20 mg  HS


 PO  4/18/18 21:00


    4/21/18 22:48


 


 


  (Plavix)  75 mg  DAILY


 PO  4/19/18 09:00


    4/21/18 11:08


 


 


  (Imdur)  30 mg  DAILY@07


 PO  4/19/18 07:00


    4/22/18 06:40


 


 


  (Synthroid)  75 mcg  DAILY@0600


 PO  4/19/18 06:00


    4/22/18 06:39


 


 


  (Coumadin)  5 mg  DAILY@1600


 PO  4/19/18 16:00


    4/21/18 18:55


 


 


 Pharmacy Profile


 Note  0 ml @ 0


 mls/hr  UNSCH


 OTHER  4/18/18 20:45


     


 


 


  (Pepcid)  20 mg  BID


 PO  4/19/18 21:00


    4/21/18 22:48


 


 


  (Tums Chew)  500 mg  Q6H  PRN


 CHEW  4/19/18 15:45


     


 


 


  (Norvasc)  5 mg  DAILY


 PO  4/20/18 10:00


    4/21/18 11:10


 


 


  (Diovan)  160 mg  DAILY


 PO  4/21/18 09:00


    4/21/18 11:09


 











A/P


Problem List:  


(1) Lung cancer


ICD Code:  C34.90 - Malignant neoplasm of unspecified part of unspecified 

bronchus or lung


(2) Metabolic encephalopathy


ICD Code:  G93.41 - Metabolic encephalopathy


(3) ARF (acute renal failure)


ICD Code:  N17.9 - Acute kidney failure, unspecified


Assessment and Plan


89-year-old male with history of lung cancer, afib, arthritis, HTN, port 

placement, presents with confusion and dizziness. 





Metabolic versus toxic encephalopathy: suspect secondary to dehydration. 

Patient with a history of lung cancer with metastases to the neck.


   -Head CT/MRI brain reviewed, no acute intracranial abnormality


   -Chest x-ray noted without any pulmonary infiltrates


   -Hold all CNS depressing medications


   -Mentation improved


   -PT consulted, recommending rehab





Acute renal failure- improved.


   -Prerenal, likely secondary to dehydration


   -Given gentle IV fluid hydration and avoid all nephrotoxic drug


   -Encourage oral intake





Soft BP: BP initially low upon arrival likely secondary to dehydration


   -Hold all antihypertensive medications for blood pressure<110/60


   -BP started to increase, restarted patient's home medications slowly 

including norvasc and diovan





History of lung cancer


   -Currently receiving radiation therapy


   -radiation oncology consult appreciated.





Atrial fibrillation: chronic, rate controlled


   -Resumed patient's Amiodarone, Coumadin.


   -Monitor INR





Other chronic medical conditions


   -Resumed outpatient medications





Atypical chest pain- now has resolved. Pain reproducible, likely 

musculoskeletal. 


   -Troponin negative and EKG without acute ischemic changes


   -[facility] notes reviewed; had cardiac cath this month and was 

evaluated by cardiology ; recommended the antianginal medications to be 

intensified.


   -Chest pain currently resolved





DVT prophylaxis: Bilateral SCDs


Discharge Planning


Discharge to SNF when arrangements made by case management, hopefully tomorrow.











Isis Quiñones PA-C Apr 22, 2018 8:17 am

## 2018-04-23 VITALS
RESPIRATION RATE: 16 BRPM | OXYGEN SATURATION: 97 % | HEART RATE: 74 BPM | TEMPERATURE: 98 F | DIASTOLIC BLOOD PRESSURE: 70 MMHG | SYSTOLIC BLOOD PRESSURE: 158 MMHG

## 2018-04-23 VITALS
HEART RATE: 70 BPM | TEMPERATURE: 98.5 F | SYSTOLIC BLOOD PRESSURE: 109 MMHG | OXYGEN SATURATION: 97 % | DIASTOLIC BLOOD PRESSURE: 63 MMHG | RESPIRATION RATE: 17 BRPM

## 2018-04-23 VITALS
HEART RATE: 66 BPM | TEMPERATURE: 97.6 F | RESPIRATION RATE: 16 BRPM | OXYGEN SATURATION: 96 % | SYSTOLIC BLOOD PRESSURE: 101 MMHG | DIASTOLIC BLOOD PRESSURE: 51 MMHG

## 2018-04-23 VITALS
RESPIRATION RATE: 16 BRPM | SYSTOLIC BLOOD PRESSURE: 138 MMHG | DIASTOLIC BLOOD PRESSURE: 57 MMHG | HEART RATE: 66 BPM | OXYGEN SATURATION: 96 %

## 2018-04-23 VITALS
RESPIRATION RATE: 16 BRPM | DIASTOLIC BLOOD PRESSURE: 66 MMHG | TEMPERATURE: 96.4 F | SYSTOLIC BLOOD PRESSURE: 144 MMHG | OXYGEN SATURATION: 97 % | HEART RATE: 65 BPM

## 2018-04-23 VITALS
SYSTOLIC BLOOD PRESSURE: 154 MMHG | TEMPERATURE: 97.8 F | RESPIRATION RATE: 16 BRPM | DIASTOLIC BLOOD PRESSURE: 71 MMHG | OXYGEN SATURATION: 97 % | HEART RATE: 62 BPM

## 2018-04-23 VITALS
OXYGEN SATURATION: 99 % | SYSTOLIC BLOOD PRESSURE: 140 MMHG | RESPIRATION RATE: 17 BRPM | HEART RATE: 68 BPM | DIASTOLIC BLOOD PRESSURE: 60 MMHG | TEMPERATURE: 98.5 F

## 2018-04-23 LAB
INR PPP: 2.8 RATIO
PROTHROMBIN TIME: 27.8 SEC (ref 9.8–11.6)

## 2018-04-23 RX ADMIN — LEVOTHYROXINE SODIUM SCH MCG: 75 TABLET ORAL at 06:10

## 2018-04-23 RX ADMIN — CLOPIDOGREL BISULFATE SCH MG: 75 TABLET, FILM COATED ORAL at 09:31

## 2018-04-23 RX ADMIN — VALSARTAN SCH MG: 160 TABLET ORAL at 09:31

## 2018-04-23 RX ADMIN — ATORVASTATIN CALCIUM SCH MG: 20 TABLET, FILM COATED ORAL at 21:07

## 2018-04-23 RX ADMIN — AMIODARONE HYDROCHLORIDE SCH MG: 200 TABLET ORAL at 09:31

## 2018-04-23 RX ADMIN — FAMOTIDINE SCH MG: 20 TABLET, FILM COATED ORAL at 09:31

## 2018-04-23 RX ADMIN — Medication SCH ML: at 09:32

## 2018-04-23 RX ADMIN — Medication SCH ML: at 21:00

## 2018-04-23 RX ADMIN — FAMOTIDINE SCH MG: 20 TABLET, FILM COATED ORAL at 21:07

## 2018-04-23 RX ADMIN — ISOSORBIDE MONONITRATE SCH MG: 30 TABLET, EXTENDED RELEASE ORAL at 06:10

## 2018-04-23 NOTE — HHI.DS
__________________________________________________





Discharge Summary


Admission Date


Apr 18, 2018 at 3:20 pm


Discharge Date:  Apr 23, 2018


Admitting Diagnosis





Altered mental status, confusion





(1) Lung cancer


ICD Code:  C34.90 - Malignant neoplasm of unspecified part of unspecified 

bronchus or lung


(2) Metabolic encephalopathy


ICD Code:  G93.41 - Metabolic encephalopathy


(3) ARF (acute renal failure)


ICD Code:  N17.9 - Acute kidney failure, unspecified


Procedures


None.


Brief History - From Admission


89-year-old man with history of lung cancer, atrial fibrillation presents to 

the ED for evaluation of worsening symptoms of confusion and dizziness.  

Patient was released yesterday from an outside hospital where he has been 

treated for chest pain 2 days.  Patient reports that this morning he woke up 

and was able to have coffee and step out of bed, however had to return back to 

bed because he was not feeling well.  He describes a spinning feeling and 

states this episode was so overwhelmed that patient could not get out of bed on 

his own and had to crawl on the floor out of his bedroom.  Head CT in the ED 

was without any evidence of intracranial abnormality.  Patient has a history of 

squamous cell lung cancer with metastasis for which he is currently receiving 

radiation therapy, however missed one session yesterday.  During my exam, 

patient was alert and oriented 3.  He denies any bladder or bowel dysfunction.


CBC/BMP:  


4/19/18 0710                                                                   

             4/19/18 0710





Significant Findings





Laboratory Tests








Test


  4/21/18


07:25 4/22/18


07:40 4/23/18


05:14


 


Prothrombin Time


  24.2 SEC


(9.8-11.6) 26.4 SEC


(9.8-11.6) 27.8 SEC


(9.8-11.6)








Imaging





Last Impressions








Chest CT 4/18/18 1710 Signed





Impressions: 





 Service Date/Time:  Wednesday, April 18, 2018 17:49 - CONCLUSION:  Bilateral 





 pulmonary nodules. Left hilar lymphadenopathy.     Sixto Arreola MD 


 


Head CT 4/18/18 1228 Signed





Impressions: 





 Service Date/Time:  Wednesday, April 18, 2018 14:26 - CONCLUSION:  No acute 





 intracranial findings     Sixto Arreola MD 


 


Chest X-Ray 4/18/18 1228 Signed





Impressions: 





 Service Date/Time:  Wednesday, April 18, 2018 12:47 - CONCLUSION: No acute 





 disease.       Stefano Christensen MD  FACR


 


Brain MRI 4/18/18 0000 Signed





Impressions: 





 Service Date/Time:  Wednesday, April 18, 2018 21:43 - CONCLUSION:  No acute 





 intracranial findings. Cystic left parotid mass     Sixto Arreola MD 








PE at Discharge


GENERAL: Well-developed, well-nourished pleasant elderly male patient in [facility]. 


SKIN: Warm and dry.


HEENT: Atraumatic. Normocephalic. Mucous membranes pink and moist.


NECK: Trachea midline.


CARDIOVASCULAR: Regular rate and rhythm. No murmur appreciated. Chest nontender 

today. 


RESPIRATORY: No accessory muscle use. Clear to auscultation. Breath sounds 

equal bilaterally. 


GASTROINTESTINAL: Abdomen soft, non-tender, nondistended. 


MUSCULOSKELETAL: Extremities without clubbing, cyanosis, or edema. No obvious 

deformities. 


NEUROLOGICAL: Awake and alert. No obvious cranial nerve deficits.  Motor 

grossly within normal limits.  Normal speech.


PSYCHIATRIC: Appropriate mood and affect; insight and judgment normal.


Hospital Course


89-year-old male with history of lung cancer, afib, arthritis, HTN, port 

placement, presents with confusion and dizziness. 





Metabolic versus toxic encephalopathy: suspect secondary to dehydration. 

Patient with a history of lung cancer with metastases to the neck. Head CT/MRI 

brain reviewed, no acute intracranial abnormality. Chest x-ray noted without 

any pulmonary infiltrates. Hold all CNS depressing medications. Mentation much 

improved. PT consulted, recommending rehab, case management to assist with 

placement. Patient held in hospital over weekend due to possible placement on 

Monday, however unable to place patient into SNF secondary to insurance 

limitations. Attempted placement in [facility] however patient only agreeing to 

admission for 3 days then adamantly wants to go home, therefore patient not 

accepted to [facility]. Patient wishes to go home with [facility], case management to 

assist. 





Acute renal failure- improved. Prerenal, likely secondary to dehydration. Given 

gentle IV fluid hydration and avoid all nephrotoxic drug. Encouraged oral 

intake. Improved, stable for discharge. 





Soft BP: BP initially low upon arrival likely secondary to dehydration. 

Initially held all antihypertensive medications for blood pressure <110/60. BP 

started to increase, restarted patient's home medications slowly including 

norvasc and diovan. Still not well controlled with systolic BP 150s, increased 

home norvasc to 10mg daily. BP improved, stable for discharge. 





History of lung cancer. Radiation oncology consult appreciated, outpatient 

follow up. 





Atrial fibrillation: chronic, rate controlled. Resumed patient's Amiodarone, 

Coumadin. INR therapeutic. 





Atypical chest pain- now has resolved. Pain reproducible, likely 

musculoskeletal. Troponin negative and EKG without acute ischemic changes. 

[facility] notes reviewed; had cardiac cath this month and was evaluated 

by cardiology ; recommended the antianginal medications to be intensified. 

Chest pain currently resolved


Pt Condition on Discharge:  Stable


Discharge Disposition:  Disch w/ Home Health Serv


Discharge Time:  > 30 minutes


Discharge Instructions


DIET: Follow Instructions for:  As Tolerated, No Restrictions


Activities you can perform:  Regular-No Restrictions


Follow up Referrals:  


PCP Follow-up - 1 Week with Manisha Webb D.o.





New Medications:  


Famotidine (Famotidine) 20 Mg Tab


20 MG PO BID for GERD, #60 TAB





 


Continued Medications:  


Amiodarone (Amiodarone) 200 Mg Tab


200 MG PO DAILY for Regulate Heart Beat, #30 TAB 0 Refills





Amlodipine (Amlodipine) 5 Mg Tab


5 MG PO DAILY for Blood Pressure Management, #30 TAB 0 Refills





Aspirin (Aspirin) Unknown Strength Tab


1 TAB PO DAILY, #30 TAB 0 Refills





Atorvastatin (Lipitor) 20 Mg Tab


20 MG PO HS for Cholesterol Management, #30 TAB 0 Refills





Clopidogrel (Plavix) 75 Mg Tab


75 MG PO DAILY for ACS for 30 Days, #30 TAB 1 Refill





Hydrocodone-Acetaminophen (Norco) 5 Mg-325 Mg Tab


1 TAB PO Q4H PRN for PAIN, TAB 0 Refills





Isosorbide Mononitrate ER (Isosorbide Mononitrate ER) 30 Mg Jose


30 MG PO DAILY@07 for Chest Pain for 30 Days, #30 TAB





Levothyroxine (Levothyroxine) 75 Mcg Tab


75 MCG PO DAILY for Thyroid, #30 TAB 0 Refills





Nitroglycerin SL (Nitroglycerin SL) 0.4 Mg Subl


0.4 MG SL AS DIRECTED PRN for CHEST PAIN, #100 TAB.SL 0 Refills


ONE TABLET UNDER THE TONGUE AS NEEDED FOR CHEST PAIN, MAY REPEAT EVERY


 FIVE MINUTES FOR A TOTAL OF 3 DOSES OR CALL 911 IF NO RELIEF


Rabeprazole (Aciphex) 20 Mg Tab


20 MG PO DAILY for Reflux, #30 TAB 0 Refills





Valsartan (Diovan) 160 Mg Tab


160 MG PO DAILY, #30 TAB 0 Refills





Warfarin (Warfarin) 5 Mg Tab


5 MG PO DAILY for Blood Clot Prevention, #30 TAB 0 Refills

















Isis Quiñones PA-C Apr 23, 2018 2:56 pm

## 2018-04-23 NOTE — HHI.PR
Subjective


Remarks


Follow up for encephalopathy, DC, afib, atypical chest pain. The patient 

reports feeling well today. He has no medical complaints. He is looking forward 

to going to [facility] today if possible. Vital signs reviewed, BP 

slightly elevated, Norvasc increased.





Objective


Vitals





Vital Signs








  Date Time  Temp Pulse Resp B/P (MAP) Pulse Ox O2 Delivery O2 Flow Rate FiO2


 


4/23/18 09:29  66 16 138/57 (84) 96   


 


4/23/18 03:19 97.8 62 16 154/71 (98) 97   


 


4/23/18 00:05 96.4 65 16 144/66 (92) 97   


 


4/22/18 22:03  69  175/82 (113)    


 


4/22/18 20:15 98.0 69 16 159/72 (101) 95   


 


4/22/18 17:15 98.1 69 18 160/83 (108) 96   


 


4/22/18 11:56 98.5 68 18 100/58 (72) 95   














I/O      


 


 4/22/18 4/22/18 4/22/18 4/23/18 4/23/18 4/23/18





 07:00 15:00 23:00 07:00 15:00 23:00


 


Intake Total  600 ml    


 


Output Total  0 ml    


 


Balance  600 ml    


 


      


 


Intake Oral  600 ml    


 


Output Stool Total  0 ml    


 


# Voids  5    








Result Diagram:  


4/19/18 0710                                                                   

             4/19/18 0710





Imaging





Last Impressions








Chest CT 4/18/18 1710 Signed





Impressions: 





 Service Date/Time:  Wednesday, April 18, 2018 17:49 - CONCLUSION:  Bilateral 





 pulmonary nodules. Left hilar lymphadenopathy.     Sixto Arreola MD 


 


Head CT 4/18/18 1228 Signed





Impressions: 





 Service Date/Time:  Wednesday, April 18, 2018 14:26 - CONCLUSION:  No acute 





 intracranial findings     Sixto Arreola MD 


 


Chest X-Ray 4/18/18 1228 Signed





Impressions: 





 Service Date/Time:  Wednesday, April 18, 2018 12:47 - CONCLUSION: No acute 





 disease.       Stefano Christensen MD  FACR


 


Brain MRI 4/18/18 0000 Signed





Impressions: 





 Service Date/Time:  Wednesday, April 18, 2018 21:43 - CONCLUSION:  No acute 





 intracranial findings. Cystic left parotid mass     Sixto Arreola MD 








Objective Remarks


GENERAL: Well-developed, well-nourished pleasant elderly male patient in [facility]. 


SKIN: Warm and dry.


HEENT: Atraumatic. Normocephalic. Mucous membranes pink and moist.


NECK: Trachea midline.


CARDIOVASCULAR: Regular rate and rhythm. No murmur appreciated. Left upper 

anterior chest at sternoclavicular joint mildly tender to palpation. 


RESPIRATORY: No accessory muscle use. Clear to auscultation. Breath sounds 

equal bilaterally. 


GASTROINTESTINAL: Abdomen soft, non-tender, nondistended. 


MUSCULOSKELETAL: Extremities without clubbing, cyanosis, or edema. No obvious 

deformities. 


NEUROLOGICAL: Awake and alert. No obvious cranial nerve deficits.  Motor 

grossly within normal limits.  Normal speech.


PSYCHIATRIC: Appropriate mood and affect; insight and judgment normal.


Medications and IVs





Current Medications








 Medications


  (Trade)  Dose


 Ordered  Sig/Ha


 Route  Start Time


 Stop Time Status Last Admin


 


  (NS Flush)  2 ml  UNSCH  PRN


 IV FLUSH  4/18/18 15:30


     


 


 


  (NS Flush)  2 ml  BID


 IV FLUSH  4/18/18 21:00


    4/23/18 09:32


 


 


  (Tylenol)  650 mg  Q4H  PRN


 PO  4/18/18 15:30


     


 


 


  (Zofran Inj)  4 mg  Q6H  PRN


 IVP  4/18/18 15:30


    4/19/18 17:39


 


 


  (Tylenol)  650 mg  Q6H  PRN


 PO  4/18/18 15:30


     


 


 


  (Narcan Inj)  0.4 mg  UNSCH  PRN


 IV PUSH  4/18/18 15:30


     


 


 


  (Milk Of


 Magnesia Liq)  30 ml  Q12H  PRN


 PO  4/18/18 15:30


     


 


 


  (Duoneb Neb)  1 ampule  Q2HR NEB  PRN


 NEB  4/18/18 15:30


     


 


 


  (Cordarone)  200 mg  DAILY


 PO  4/19/18 09:00


    4/23/18 09:31


 


 


  (Lipitor)  20 mg  HS


 PO  4/18/18 21:00


    4/22/18 22:00


 


 


  (Plavix)  75 mg  DAILY


 PO  4/19/18 09:00


    4/23/18 09:31


 


 


  (Imdur)  30 mg  DAILY@07


 PO  4/19/18 07:00


    4/23/18 06:10


 


 


  (Synthroid)  75 mcg  DAILY@0600


 PO  4/19/18 06:00


    4/23/18 06:10


 


 


 Pharmacy Profile


 Note  0 ml @ 0


 mls/hr  UNSCH


 OTHER  4/18/18 20:45


     


 


 


  (Pepcid)  20 mg  BID


 PO  4/19/18 21:00


    4/23/18 09:31


 


 


  (Tums Chew)  500 mg  Q6H  PRN


 CHEW  4/19/18 15:45


     


 


 


  (Diovan)  160 mg  DAILY


 PO  4/21/18 09:00


    4/23/18 09:31


 


 


  (Norvasc)  10 mg  DAILY


 PO  4/23/18 09:00


    4/23/18 09:31


 


 


  (Coumadin)  3 mg  DAILY@1600


 PO  4/24/18 16:00


     


 











A/P


Problem List:  


(1) Lung cancer


ICD Code:  C34.90 - Malignant neoplasm of unspecified part of unspecified 

bronchus or lung


(2) Metabolic encephalopathy


ICD Code:  G93.41 - Metabolic encephalopathy


(3) ARF (acute renal failure)


ICD Code:  N17.9 - Acute kidney failure, unspecified


Assessment and Plan


89-year-old male with history of lung cancer, afib, arthritis, HTN, port 

placement, presents with confusion and dizziness. 





Metabolic versus toxic encephalopathy: suspect secondary to dehydration. 

Patient with a history of lung cancer with metastases to the neck.


   -Head CT/MRI brain reviewed, no acute intracranial abnormality


   -Chest x-ray noted without any pulmonary infiltrates


   -Hold all CNS depressing medications


   -Mentation improved


   -PT consulted, recommending rehab, case management to assist with placement





Acute renal failure- improved.


   -Prerenal, likely secondary to dehydration


   -Given gentle IV fluid hydration and avoid all nephrotoxic drug


   -Encourage oral intake





Soft BP: BP initially low upon arrival likely secondary to dehydration


   -Hold all antihypertensive medications for blood pressure<110/60


   -BP started to increase, restarted patient's home medications slowly 

including norvasc and diovan


   -still not well controlled with systolic BP 150s, will increase home norvasc 

to 10mg daily





History of lung cancer


   -Currently receiving radiation therapy


   -radiation oncology consult appreciated.





Atrial fibrillation: chronic, rate controlled


   -Resumed patient's Amiodarone, Coumadin.


   -Monitor INR, therapeutic





Other chronic medical conditions


   -Resumed outpatient medications





Atypical chest pain- now has resolved. Pain reproducible, likely 

musculoskeletal. 


   -Troponin negative and EKG without acute ischemic changes


   -[facility] notes reviewed; had cardiac cath this month and was 

evaluated by cardiology ; recommended the antianginal medications to be 

intensified.


   -Chest pain currently resolved





DVT prophylaxis: Bilateral SCDs


Discharge Planning


Discharge to SNF when arrangements made by case management, hopefully going to 

[facility] today.











Isis Quiñones PA-C Apr 23, 2018 11:48 am

## 2018-04-24 VITALS
HEART RATE: 71 BPM | DIASTOLIC BLOOD PRESSURE: 78 MMHG | RESPIRATION RATE: 17 BRPM | OXYGEN SATURATION: 96 % | SYSTOLIC BLOOD PRESSURE: 149 MMHG | TEMPERATURE: 98.3 F

## 2018-04-24 VITALS
SYSTOLIC BLOOD PRESSURE: 141 MMHG | OXYGEN SATURATION: 97 % | DIASTOLIC BLOOD PRESSURE: 63 MMHG | TEMPERATURE: 97.7 F | RESPIRATION RATE: 18 BRPM | HEART RATE: 68 BPM

## 2018-04-24 LAB
INR PPP: 3 RATIO
PROTHROMBIN TIME: 30 SEC (ref 9.8–11.6)

## 2018-04-24 RX ADMIN — AMIODARONE HYDROCHLORIDE SCH MG: 200 TABLET ORAL at 08:57

## 2018-04-24 RX ADMIN — Medication SCH ML: at 08:58

## 2018-04-24 RX ADMIN — FAMOTIDINE SCH MG: 20 TABLET, FILM COATED ORAL at 08:57

## 2018-04-24 RX ADMIN — ISOSORBIDE MONONITRATE SCH MG: 30 TABLET, EXTENDED RELEASE ORAL at 08:57

## 2018-04-24 RX ADMIN — CLOPIDOGREL BISULFATE SCH MG: 75 TABLET, FILM COATED ORAL at 08:57

## 2018-04-24 RX ADMIN — LEVOTHYROXINE SODIUM SCH MCG: 75 TABLET ORAL at 06:55

## 2018-04-24 RX ADMIN — VALSARTAN SCH MG: 160 TABLET ORAL at 08:57

## 2018-04-24 NOTE — HHI.PR
Subjective


Remarks


The patient was resting comfortably in bed.  He said he was willing to go to 

rehab but he needs to get back home to his wife soon.  He said that he was in 

the [employer] and then went on to have multiple jobs over the years.  He said that 

he fell down and broke his neck and was in a wheelchair for a while.  Discussed 

with nursing.





Objective


Vitals





Vital Signs








  Date Time  Temp Pulse Resp B/P (MAP) Pulse Ox O2 Delivery O2 Flow Rate FiO2


 


4/24/18 07:39 97.7 68 18 141/63 (89) 97   


 


4/24/18 04:30 98.3 71 17 149/78 (101) 96   


 


4/23/18 23:52 98.5 68 17 140/60 (86) 99   


 


4/23/18 20:33 98.5 70 17 109/63 (78) 97   


 


4/23/18 16:18 98.0 74 16 158/70 (99) 97   


 


4/23/18 13:18 97.6 66 16 101/51 (68) 96   














I/O      


 


 4/23/18 4/23/18 4/23/18 4/24/18 4/24/18 4/24/18





 06:59 14:59 22:59 06:59 14:59 22:59


 


Intake Total    200 ml 200 ml 


 


Balance    200 ml 200 ml 


 


      


 


Intake Oral    200 ml 200 ml 








Imaging





Last Impressions








Chest CT 4/18/18 1710 Signed





Impressions: 





 Service Date/Time:  Wednesday, April 18, 2018 17:49 - CONCLUSION:  Bilateral 





 pulmonary nodules. Left hilar lymphadenopathy.     Sixto Arreola MD 


 


Head CT 4/18/18 1228 Signed





Impressions: 





 Service Date/Time:  Wednesday, April 18, 2018 14:26 - CONCLUSION:  No acute 





 intracranial findings     Sixto Arreola MD 


 


Chest X-Ray 4/18/18 1228 Signed





Impressions: 





 Service Date/Time:  Wednesday, April 18, 2018 12:47 - CONCLUSION: No acute 





 disease.       Stefano Christensen MD  FA[facility]


 


Brain MRI 4/18/18 0000 Signed





Impressions: 





 Service Date/Time:  Wednesday, April 18, 2018 21:43 - CONCLUSION:  No acute 





 intracranial findings. Cystic left parotid mass     Sixto Arreola MD 








Objective Remarks


GENERAL: Well-developed, well-nourished pleasant elderly male patient in NAD. 


SKIN: Warm and dry.


HEENT: Atraumatic. Normocephalic. Mucous membranes pink and moist.


NECK: Trachea midline.


CARDIOVASCULAR: Regular rate and rhythm. No murmur appreciated. 


RESPIRATORY: No accessory muscle use. Clear to auscultation. Breath sounds 

equal bilaterally. 


GASTROINTESTINAL: Abdomen soft, non-tender, nondistended. 


MUSCULOSKELETAL: Extremities without clubbing, cyanosis, or edema. No obvious 

deformities. 


NEUROLOGICAL: Awake and alert. No obvious cranial nerve deficits.  Motor 

grossly within normal limits.  Normal speech.


PSYCHIATRIC: Appropriate mood and affect; insight and judgment normal.


Procedures


None.


Medications and IVs





Current Medications








 Medications


  (Trade)  Dose


 Ordered  Sig/Ha


 Route  Start Time


 Stop Time Status Last Admin


 


  (NS Flush)  2 ml  UNSCH  PRN


 IV FLUSH  4/18/18 15:30


     


 


 


  (NS Flush)  2 ml  BID


 IV FLUSH  4/18/18 21:00


    4/24/18 08:58


 


 


  (Tylenol)  650 mg  Q4H  PRN


 PO  4/18/18 15:30


     


 


 


  (Zofran Inj)  4 mg  Q6H  PRN


 IVP  4/18/18 15:30


    4/19/18 17:39


 


 


  (Tylenol)  650 mg  Q6H  PRN


 PO  4/18/18 15:30


     


 


 


  (Narcan Inj)  0.4 mg  UNSCH  PRN


 IV PUSH  4/18/18 15:30


     


 


 


  (Milk Of


 Magnesia Liq)  30 ml  Q12H  PRN


 PO  4/18/18 15:30


     


 


 


  (Duoneb Neb)  1 ampule  Q2HR NEB  PRN


 NEB  4/18/18 15:30


     


 


 


  (Cordarone)  200 mg  DAILY


 PO  4/19/18 09:00


    4/24/18 08:57


 


 


  (Lipitor)  20 mg  HS


 PO  4/18/18 21:00


    4/23/18 21:07


 


 


  (Plavix)  75 mg  DAILY


 PO  4/19/18 09:00


    4/24/18 08:57


 


 


  (Imdur)  30 mg  DAILY@07


 PO  4/19/18 07:00


    4/24/18 08:57


 


 


  (Synthroid)  75 mcg  DAILY@0600


 PO  4/19/18 06:00


    4/24/18 06:55


 


 


 Pharmacy Profile


 Note  0 ml @ 0


 mls/hr  UNSCH


 OTHER  4/18/18 20:45


     


 


 


  (Pepcid)  20 mg  BID


 PO  4/19/18 21:00


    4/24/18 08:57


 


 


  (Tums Chew)  500 mg  Q6H  PRN


 CHEW  4/19/18 15:45


     


 


 


  (Diovan)  160 mg  DAILY


 PO  4/21/18 09:00


    4/24/18 08:57


 


 


  (Norvasc)  10 mg  DAILY


 PO  4/23/18 09:00


    4/24/18 08:57


 


 


  (Coumadin)  3 mg  DAILY@1600


 PO  4/24/18 16:00


   Future Hold  


 











A/P


Problem List:  


(1) Lung cancer


ICD Code:  C34.90 - Malignant neoplasm of unspecified part of unspecified 

bronchus or lung


(2) Metabolic encephalopathy


ICD Code:  G93.41 - Metabolic encephalopathy


(3) ARF (acute renal failure)


ICD Code:  N17.9 - Acute kidney failure, unspecified


Assessment and Plan


89-year-old male with history of lung cancer, afib, arthritis, HTN, port 

placement, presents with confusion and dizziness. 





Metabolic versus toxic encephalopathy: suspect secondary to dehydration. 

Patient with a history of lung cancer with metastases to the neck.


   -Head CT/MRI brain reviewed, no acute intracranial abnormality


   -Chest x-ray noted without any pulmonary infiltrates


   -Hold all CNS depressing medications


   -Mentation improved


   -PT consulted, recommending rehab, case management to assist with placement. 

Scheduled to go to [facility] 4/24.





Acute renal failure- improved.


   -Prerenal, likely secondary to dehydration


   -Given gentle IV fluid hydration and avoid all nephrotoxic drug


   -Encourage oral intake





Soft BP: BP initially low upon arrival likely secondary to dehydration


   -Hold all antihypertensive medications for blood pressure<110/60


   -BP started to increase, restarted patient's home medications slowly 

including Diovan. We increased Norvasc.





History of lung cancer


   -Currently receiving radiation therapy


   -radiation oncology consult appreciated.


   - outpt follow-up.





Atrial fibrillation: chronic, rate controlled


   -Resumed patient's Amiodarone, Coumadin.


   -Monitor INR, therapeutic





Other chronic medical conditions


   -Resumed outpatient medications





Atypical chest pain- now has resolved. Pain reproducible, likely 

musculoskeletal. 


   -Troponin negative and EKG without acute ischemic changes


   -[facility] notes reviewed; had cardiac cath this month and was 

evaluated by cardiology ; recommended the antianginal medications to be 

intensified.


   -Chest pain currently resolved


   - outpt follow-up with cardiology.





DVT prophylaxis: Bilateral SCDs, Coumadin


Discharge Planning


D/c to rehab











Sayess,Clark. DO Apr 24, 2018 11:23